# Patient Record
Sex: FEMALE | Race: OTHER | HISPANIC OR LATINO
[De-identification: names, ages, dates, MRNs, and addresses within clinical notes are randomized per-mention and may not be internally consistent; named-entity substitution may affect disease eponyms.]

---

## 2019-02-14 ENCOUNTER — RESULT REVIEW (OUTPATIENT)
Age: 54
End: 2019-02-14

## 2019-12-26 ENCOUNTER — RESULT REVIEW (OUTPATIENT)
Age: 54
End: 2019-12-26

## 2020-01-21 ENCOUNTER — TRANSCRIPTION ENCOUNTER (OUTPATIENT)
Age: 55
End: 2020-01-21

## 2020-01-30 ENCOUNTER — TRANSCRIPTION ENCOUNTER (OUTPATIENT)
Age: 55
End: 2020-01-30

## 2020-10-24 ENCOUNTER — TRANSCRIPTION ENCOUNTER (OUTPATIENT)
Age: 55
End: 2020-10-24

## 2020-11-06 ENCOUNTER — TRANSCRIPTION ENCOUNTER (OUTPATIENT)
Age: 55
End: 2020-11-06

## 2020-12-18 ENCOUNTER — TRANSCRIPTION ENCOUNTER (OUTPATIENT)
Age: 55
End: 2020-12-18

## 2021-02-26 PROBLEM — Z00.00 ENCOUNTER FOR PREVENTIVE HEALTH EXAMINATION: Status: ACTIVE | Noted: 2021-02-26

## 2021-02-28 ENCOUNTER — OUTPATIENT (OUTPATIENT)
Dept: OUTPATIENT SERVICES | Facility: HOSPITAL | Age: 56
LOS: 1 days | End: 2021-02-28
Payer: COMMERCIAL

## 2021-02-28 ENCOUNTER — APPOINTMENT (OUTPATIENT)
Dept: CT IMAGING | Facility: IMAGING CENTER | Age: 56
End: 2021-02-28
Payer: COMMERCIAL

## 2021-02-28 DIAGNOSIS — Z00.8 ENCOUNTER FOR OTHER GENERAL EXAMINATION: ICD-10-CM

## 2021-02-28 PROCEDURE — 70486 CT MAXILLOFACIAL W/O DYE: CPT | Mod: 26

## 2021-02-28 PROCEDURE — 70486 CT MAXILLOFACIAL W/O DYE: CPT

## 2021-10-27 ENCOUNTER — NON-APPOINTMENT (OUTPATIENT)
Age: 56
End: 2021-10-27

## 2021-11-02 ENCOUNTER — APPOINTMENT (OUTPATIENT)
Dept: GYNECOLOGIC ONCOLOGY | Facility: CLINIC | Age: 56
End: 2021-11-02
Payer: COMMERCIAL

## 2021-11-02 VITALS
BODY MASS INDEX: 33.13 KG/M2 | HEIGHT: 63 IN | WEIGHT: 187 LBS | SYSTOLIC BLOOD PRESSURE: 163 MMHG | DIASTOLIC BLOOD PRESSURE: 91 MMHG | HEART RATE: 86 BPM

## 2021-11-02 DIAGNOSIS — Z80.42 FAMILY HISTORY OF MALIGNANT NEOPLASM OF PROSTATE: ICD-10-CM

## 2021-11-02 DIAGNOSIS — Z80.6 FAMILY HISTORY OF LEUKEMIA: ICD-10-CM

## 2021-11-02 DIAGNOSIS — Z80.3 FAMILY HISTORY OF MALIGNANT NEOPLASM OF BREAST: ICD-10-CM

## 2021-11-02 DIAGNOSIS — Z76.89 PERSONS ENCOUNTERING HEALTH SERVICES IN OTHER SPECIFIED CIRCUMSTANCES: ICD-10-CM

## 2021-11-02 PROCEDURE — 99204 OFFICE O/P NEW MOD 45 MIN: CPT

## 2021-11-02 RX ORDER — NAPROXEN 500 MG/1
500 TABLET, DELAYED RELEASE ORAL
Refills: 0 | Status: ACTIVE | COMMUNITY

## 2021-11-02 RX ORDER — MONTELUKAST 10 MG/1
10 TABLET, FILM COATED ORAL
Refills: 0 | Status: ACTIVE | COMMUNITY

## 2021-11-10 ENCOUNTER — NON-APPOINTMENT (OUTPATIENT)
Age: 56
End: 2021-11-10

## 2021-11-10 DIAGNOSIS — Z01.812 ENCOUNTER FOR PREPROCEDURAL LABORATORY EXAMINATION: ICD-10-CM

## 2021-11-10 DIAGNOSIS — F41.9 ANXIETY DISORDER, UNSPECIFIED: ICD-10-CM

## 2021-11-10 RX ORDER — ALPRAZOLAM 0.25 MG/1
0.25 TABLET ORAL
Qty: 2 | Refills: 0 | Status: ACTIVE | COMMUNITY
Start: 2021-11-10 | End: 1900-01-01

## 2021-11-14 ENCOUNTER — APPOINTMENT (OUTPATIENT)
Dept: MRI IMAGING | Facility: CLINIC | Age: 56
End: 2021-11-14
Payer: COMMERCIAL

## 2021-11-14 ENCOUNTER — TRANSCRIPTION ENCOUNTER (OUTPATIENT)
Age: 56
End: 2021-11-14

## 2021-11-14 ENCOUNTER — OUTPATIENT (OUTPATIENT)
Dept: OUTPATIENT SERVICES | Facility: HOSPITAL | Age: 56
LOS: 1 days | End: 2021-11-14
Payer: COMMERCIAL

## 2021-11-14 DIAGNOSIS — R19.00 INTRA-ABDOMINAL AND PELVIC SWELLING, MASS AND LUMP, UNSPECIFIED SITE: ICD-10-CM

## 2021-11-14 DIAGNOSIS — Z00.8 ENCOUNTER FOR OTHER GENERAL EXAMINATION: ICD-10-CM

## 2021-11-14 PROCEDURE — 72197 MRI PELVIS W/O & W/DYE: CPT | Mod: 26

## 2021-11-14 PROCEDURE — 72197 MRI PELVIS W/O & W/DYE: CPT

## 2021-11-14 PROCEDURE — A9585: CPT

## 2021-11-21 ENCOUNTER — NON-APPOINTMENT (OUTPATIENT)
Age: 56
End: 2021-11-21

## 2021-11-23 ENCOUNTER — NON-APPOINTMENT (OUTPATIENT)
Age: 56
End: 2021-11-23

## 2021-11-23 ENCOUNTER — APPOINTMENT (OUTPATIENT)
Dept: SURGICAL ONCOLOGY | Facility: CLINIC | Age: 56
End: 2021-11-23
Payer: COMMERCIAL

## 2021-11-23 VITALS
WEIGHT: 185 LBS | DIASTOLIC BLOOD PRESSURE: 97 MMHG | HEART RATE: 88 BPM | TEMPERATURE: 97.7 F | HEIGHT: 63 IN | OXYGEN SATURATION: 95 % | BODY MASS INDEX: 32.78 KG/M2 | SYSTOLIC BLOOD PRESSURE: 175 MMHG | RESPIRATION RATE: 16 BRPM

## 2021-11-23 PROCEDURE — 99204 OFFICE O/P NEW MOD 45 MIN: CPT

## 2021-12-01 NOTE — ASSESSMENT
[FreeTextEntry1] : 55 y/o female with large symptomatic pelvis mass.\par Lesion encases rectosigmoid colon to deep pelvis, but does not appear to invade.\par \par Plan:  Will coordinate with Dr. Barnes for surgical date.  Discussed with patient possible need for rectosigmoid resection during extirpation of pelvic mass.  Possibility of temporary diverting ileostomy discussed.  She will need oral/mechanical bowel preparation prior to surgery.  All questions answered.

## 2021-12-01 NOTE — HISTORY OF PRESENT ILLNESS
[de-identified] : Ms. 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  Mass measures 12.3 x 9.0 x 9.3 cm.\par She was evaluated by Dr. Barnes, and referred to our office for evaluation in the event rectosigmoid resection is indicated at time of resection.

## 2021-12-27 ENCOUNTER — NON-APPOINTMENT (OUTPATIENT)
Age: 56
End: 2021-12-27

## 2022-01-13 ENCOUNTER — OUTPATIENT (OUTPATIENT)
Dept: OUTPATIENT SERVICES | Facility: HOSPITAL | Age: 57
LOS: 1 days | End: 2022-01-13

## 2022-01-13 VITALS
SYSTOLIC BLOOD PRESSURE: 140 MMHG | HEART RATE: 85 BPM | OXYGEN SATURATION: 99 % | HEIGHT: 62.5 IN | RESPIRATION RATE: 16 BRPM | DIASTOLIC BLOOD PRESSURE: 88 MMHG | TEMPERATURE: 98 F | WEIGHT: 182.1 LBS

## 2022-01-13 DIAGNOSIS — Z90.49 ACQUIRED ABSENCE OF OTHER SPECIFIED PARTS OF DIGESTIVE TRACT: Chronic | ICD-10-CM

## 2022-01-13 DIAGNOSIS — Z98.51 TUBAL LIGATION STATUS: Chronic | ICD-10-CM

## 2022-01-13 DIAGNOSIS — R19.09 OTHER INTRA-ABDOMINAL AND PELVIC SWELLING, MASS AND LUMP: ICD-10-CM

## 2022-01-13 DIAGNOSIS — Z98.890 OTHER SPECIFIED POSTPROCEDURAL STATES: Chronic | ICD-10-CM

## 2022-01-13 DIAGNOSIS — R19.00 INTRA-ABDOMINAL AND PELVIC SWELLING, MASS AND LUMP, UNSPECIFIED SITE: ICD-10-CM

## 2022-01-13 LAB
A1C WITH ESTIMATED AVERAGE GLUCOSE RESULT: 5.7 % — HIGH (ref 4–5.6)
ALBUMIN SERPL ELPH-MCNC: 4.3 G/DL — SIGNIFICANT CHANGE UP (ref 3.3–5)
ALP SERPL-CCNC: 124 U/L — HIGH (ref 40–120)
ALT FLD-CCNC: 31 U/L — SIGNIFICANT CHANGE UP (ref 4–33)
ANION GAP SERPL CALC-SCNC: 8 MMOL/L — SIGNIFICANT CHANGE UP (ref 7–14)
AST SERPL-CCNC: 18 U/L — SIGNIFICANT CHANGE UP (ref 4–32)
BILIRUB SERPL-MCNC: 0.5 MG/DL — SIGNIFICANT CHANGE UP (ref 0.2–1.2)
BLD GP AB SCN SERPL QL: NEGATIVE — SIGNIFICANT CHANGE UP
BUN SERPL-MCNC: 15 MG/DL — SIGNIFICANT CHANGE UP (ref 7–23)
CALCIUM SERPL-MCNC: 9.4 MG/DL — SIGNIFICANT CHANGE UP (ref 8.4–10.5)
CHLORIDE SERPL-SCNC: 102 MMOL/L — SIGNIFICANT CHANGE UP (ref 98–107)
CO2 SERPL-SCNC: 28 MMOL/L — SIGNIFICANT CHANGE UP (ref 22–31)
CREAT SERPL-MCNC: 0.71 MG/DL — SIGNIFICANT CHANGE UP (ref 0.5–1.3)
ESTIMATED AVERAGE GLUCOSE: 117 — SIGNIFICANT CHANGE UP
GLUCOSE SERPL-MCNC: 106 MG/DL — HIGH (ref 70–99)
HCT VFR BLD CALC: 42.1 % — SIGNIFICANT CHANGE UP (ref 34.5–45)
HGB BLD-MCNC: 13.8 G/DL — SIGNIFICANT CHANGE UP (ref 11.5–15.5)
MCHC RBC-ENTMCNC: 30 PG — SIGNIFICANT CHANGE UP (ref 27–34)
MCHC RBC-ENTMCNC: 32.8 GM/DL — SIGNIFICANT CHANGE UP (ref 32–36)
MCV RBC AUTO: 91.5 FL — SIGNIFICANT CHANGE UP (ref 80–100)
NRBC # BLD: 0 /100 WBCS — SIGNIFICANT CHANGE UP
NRBC # FLD: 0 K/UL — SIGNIFICANT CHANGE UP
PLATELET # BLD AUTO: 377 K/UL — SIGNIFICANT CHANGE UP (ref 150–400)
POTASSIUM SERPL-MCNC: 3.1 MMOL/L — LOW (ref 3.5–5.3)
POTASSIUM SERPL-SCNC: 3.1 MMOL/L — LOW (ref 3.5–5.3)
PROT SERPL-MCNC: 7.5 G/DL — SIGNIFICANT CHANGE UP (ref 6–8.3)
RBC # BLD: 4.6 M/UL — SIGNIFICANT CHANGE UP (ref 3.8–5.2)
RBC # FLD: 13.2 % — SIGNIFICANT CHANGE UP (ref 10.3–14.5)
RH IG SCN BLD-IMP: POSITIVE — SIGNIFICANT CHANGE UP
SODIUM SERPL-SCNC: 138 MMOL/L — SIGNIFICANT CHANGE UP (ref 135–145)
WBC # BLD: 7.34 K/UL — SIGNIFICANT CHANGE UP (ref 3.8–10.5)
WBC # FLD AUTO: 7.34 K/UL — SIGNIFICANT CHANGE UP (ref 3.8–10.5)

## 2022-01-13 RX ORDER — SODIUM CHLORIDE 9 MG/ML
3 INJECTION INTRAMUSCULAR; INTRAVENOUS; SUBCUTANEOUS EVERY 8 HOURS
Refills: 0 | Status: DISCONTINUED | OUTPATIENT
Start: 2022-01-24 | End: 2022-01-26

## 2022-01-13 RX ORDER — SODIUM CHLORIDE 9 MG/ML
1000 INJECTION, SOLUTION INTRAVENOUS
Refills: 0 | Status: DISCONTINUED | OUTPATIENT
Start: 2022-01-24 | End: 2022-01-24

## 2022-01-13 RX ORDER — MONTELUKAST 4 MG/1
1 TABLET, CHEWABLE ORAL
Qty: 0 | Refills: 0 | DISCHARGE

## 2022-01-13 NOTE — H&P PST ADULT - HISTORY OF PRESENT ILLNESS
56 year old female with c/o pelvic pain since 7/2021, had imaging done which revealed mass. Pt presents today for presurgical evaluation for ... 56 year old female with c/o pelvic pain since 7/2021, had imaging done which revealed mass. Pt presents today for presurgical evaluation for Exploratory Laparotomy, Resection of Pelvic Mass, Possible Total Abdominal Hysterectomy, Bilateral Salpingo-Oophorectomy, Staging.

## 2022-01-13 NOTE — H&P PST ADULT - PROBLEM SELECTOR PLAN 1
Pt scheduled for surgery on 1/24/2022.  Pre-op instructions provided. Pt verbalized understanding.   Pepcid provided for GI prophylaxis.   Pt given detailed verbal and written instructions on chlorhexidine wash. Pt verbalized understanding with teachback.

## 2022-01-13 NOTE — H&P PST ADULT - NSICDXPASTSURGICALHX_GEN_ALL_CORE_FT
PAST SURGICAL HISTORY:  H/O tubal ligation     S/P breast biopsy 2021 - benign    S/P cholecystectomy

## 2022-01-13 NOTE — H&P PST ADULT - NSICDXFAMILYHX_GEN_ALL_CORE_FT
FAMILY HISTORY:  Father  Still living? Unknown  FH: prostate cancer, Age at diagnosis: Age Unknown    Mother  Still living? Unknown  FH: breast cancer, Age at diagnosis: Age Unknown  FH: leukemia, Age at diagnosis: Age Unknown

## 2022-01-14 ENCOUNTER — NON-APPOINTMENT (OUTPATIENT)
Age: 57
End: 2022-01-14

## 2022-01-14 PROBLEM — J45.909 UNSPECIFIED ASTHMA, UNCOMPLICATED: Chronic | Status: ACTIVE | Noted: 2022-01-13

## 2022-01-14 PROBLEM — U07.1 COVID-19: Chronic | Status: ACTIVE | Noted: 2022-01-13

## 2022-01-18 RX ORDER — POTASSIUM CHLORIDE 1500 MG/1
20 TABLET, FILM COATED, EXTENDED RELEASE ORAL
Qty: 4 | Refills: 0 | Status: ACTIVE | COMMUNITY
Start: 2022-01-18 | End: 1900-01-01

## 2022-01-18 RX ORDER — NEOMYCIN SULFATE 500 MG/1
500 TABLET ORAL
Qty: 6 | Refills: 0 | Status: ACTIVE | COMMUNITY
Start: 2022-01-18 | End: 1900-01-01

## 2022-01-18 RX ORDER — METRONIDAZOLE 250 MG/1
250 TABLET ORAL
Qty: 3 | Refills: 0 | Status: ACTIVE | COMMUNITY
Start: 2022-01-18 | End: 1900-01-01

## 2022-01-20 ENCOUNTER — APPOINTMENT (OUTPATIENT)
Dept: GYNECOLOGIC ONCOLOGY | Facility: CLINIC | Age: 57
End: 2022-01-20
Payer: COMMERCIAL

## 2022-01-20 PROCEDURE — 99214 OFFICE O/P EST MOD 30 MIN: CPT | Mod: 95

## 2022-01-21 ENCOUNTER — NON-APPOINTMENT (OUTPATIENT)
Age: 57
End: 2022-01-21

## 2022-01-21 LAB
ANION GAP SERPL CALC-SCNC: 12 MMOL/L
BUN SERPL-MCNC: 12 MG/DL
CALCIUM SERPL-MCNC: 9.7 MG/DL
CHLORIDE SERPL-SCNC: 106 MMOL/L
CO2 SERPL-SCNC: 25 MMOL/L
CREAT SERPL-MCNC: 0.76 MG/DL
GLUCOSE SERPL-MCNC: 106 MG/DL
POTASSIUM SERPL-SCNC: 4.2 MMOL/L
SODIUM SERPL-SCNC: 142 MMOL/L

## 2022-01-21 NOTE — ASU PATIENT PROFILE, ADULT - DATE OF LAST VACCINATION
03-May-2021 Finasteride Pregnancy And Lactation Text: This medication is absolutely contraindicated during pregnancy. It is unknown if it is excreted in breast milk.

## 2022-01-21 NOTE — ASU PATIENT PROFILE, ADULT - FALL HARM RISK - UNIVERSAL INTERVENTIONS
Bed in lowest position, wheels locked, appropriate side rails in place/Call bell, personal items and telephone in reach/Instruct patient to call for assistance before getting out of bed or chair/Non-slip footwear when patient is out of bed/[place] to call system/Physically safe environment - no spills, clutter or unnecessary equipment/Purposeful Proactive Rounding/Room/bathroom lighting operational, light cord in reach

## 2022-01-23 ENCOUNTER — TRANSCRIPTION ENCOUNTER (OUTPATIENT)
Age: 57
End: 2022-01-23

## 2022-01-24 ENCOUNTER — APPOINTMENT (OUTPATIENT)
Dept: GYNECOLOGIC ONCOLOGY | Facility: HOSPITAL | Age: 57
End: 2022-01-24

## 2022-01-24 ENCOUNTER — RESULT REVIEW (OUTPATIENT)
Age: 57
End: 2022-01-24

## 2022-01-24 ENCOUNTER — APPOINTMENT (OUTPATIENT)
Dept: SURGICAL ONCOLOGY | Facility: HOSPITAL | Age: 57
End: 2022-01-24

## 2022-01-24 ENCOUNTER — INPATIENT (INPATIENT)
Facility: HOSPITAL | Age: 57
LOS: 1 days | Discharge: HOME | End: 2022-01-26
Attending: OBSTETRICS & GYNECOLOGY | Admitting: OBSTETRICS & GYNECOLOGY
Payer: COMMERCIAL

## 2022-01-24 VITALS
HEIGHT: 62.5 IN | RESPIRATION RATE: 18 BRPM | WEIGHT: 182.1 LBS | SYSTOLIC BLOOD PRESSURE: 136 MMHG | HEART RATE: 87 BPM | TEMPERATURE: 98 F | DIASTOLIC BLOOD PRESSURE: 88 MMHG | OXYGEN SATURATION: 100 %

## 2022-01-24 DIAGNOSIS — Z90.49 ACQUIRED ABSENCE OF OTHER SPECIFIED PARTS OF DIGESTIVE TRACT: Chronic | ICD-10-CM

## 2022-01-24 DIAGNOSIS — R19.09 OTHER INTRA-ABDOMINAL AND PELVIC SWELLING, MASS AND LUMP: ICD-10-CM

## 2022-01-24 DIAGNOSIS — R19.00 INTRA-ABDOMINAL AND PELVIC SWELLING, MASS AND LUMP, UNSPECIFIED SITE: ICD-10-CM

## 2022-01-24 DIAGNOSIS — J45.909 UNSPECIFIED ASTHMA, UNCOMPLICATED: ICD-10-CM

## 2022-01-24 DIAGNOSIS — Z98.51 TUBAL LIGATION STATUS: Chronic | ICD-10-CM

## 2022-01-24 DIAGNOSIS — Z98.890 OTHER SPECIFIED POSTPROCEDURAL STATES: Chronic | ICD-10-CM

## 2022-01-24 LAB
GAS PNL BLDA: SIGNIFICANT CHANGE UP
GAS PNL BLDA: SIGNIFICANT CHANGE UP
GLUCOSE BLDC GLUCOMTR-MCNC: 101 MG/DL — HIGH (ref 70–99)

## 2022-01-24 PROCEDURE — 88307 TISSUE EXAM BY PATHOLOGIST: CPT | Mod: 26

## 2022-01-24 PROCEDURE — 49215 EXCISE SACRAL SPINE TUMOR: CPT

## 2022-01-24 PROCEDURE — 88302 TISSUE EXAM BY PATHOLOGIST: CPT | Mod: 26

## 2022-01-24 PROCEDURE — 58150 TOTAL HYSTERECTOMY: CPT

## 2022-01-24 PROCEDURE — 88331 PATH CONSLTJ SURG 1 BLK 1SPC: CPT | Mod: 26

## 2022-01-24 PROCEDURE — 88342 IMHCHEM/IMCYTCHM 1ST ANTB: CPT | Mod: 26

## 2022-01-24 PROCEDURE — 88341 IMHCHEM/IMCYTCHM EA ADD ANTB: CPT | Mod: 26

## 2022-01-24 DEVICE — SEPRAFILM 5 X 6": Type: IMPLANTABLE DEVICE | Status: FUNCTIONAL

## 2022-01-24 DEVICE — VISTASEAL FIBRIN HUMAN 10ML: Type: IMPLANTABLE DEVICE | Status: FUNCTIONAL

## 2022-01-24 DEVICE — LIGATING CLIPS WECK HORIZON LARGE (ORANGE) 24: Type: IMPLANTABLE DEVICE | Status: FUNCTIONAL

## 2022-01-24 DEVICE — LIGATING CLIPS WECK HORIZON MEDIUM (BLUE) 24: Type: IMPLANTABLE DEVICE | Status: FUNCTIONAL

## 2022-01-24 RX ORDER — ACETAMINOPHEN 500 MG
1000 TABLET ORAL ONCE
Refills: 0 | Status: DISCONTINUED | OUTPATIENT
Start: 2022-01-24 | End: 2022-01-25

## 2022-01-24 RX ORDER — ALBUTEROL 90 UG/1
2 AEROSOL, METERED ORAL EVERY 6 HOURS
Refills: 0 | Status: DISCONTINUED | OUTPATIENT
Start: 2022-01-24 | End: 2022-01-26

## 2022-01-24 RX ORDER — IBUPROFEN 200 MG
600 TABLET ORAL EVERY 6 HOURS
Refills: 0 | Status: COMPLETED | OUTPATIENT
Start: 2022-01-24 | End: 2022-12-23

## 2022-01-24 RX ORDER — HYDROMORPHONE HYDROCHLORIDE 2 MG/ML
0.5 INJECTION INTRAMUSCULAR; INTRAVENOUS; SUBCUTANEOUS
Refills: 0 | Status: DISCONTINUED | OUTPATIENT
Start: 2022-01-24 | End: 2022-01-24

## 2022-01-24 RX ORDER — CHLORHEXIDINE GLUCONATE 213 G/1000ML
1 SOLUTION TOPICAL ONCE
Refills: 0 | Status: COMPLETED | OUTPATIENT
Start: 2022-01-24 | End: 2022-01-24

## 2022-01-24 RX ORDER — SODIUM CHLORIDE 9 MG/ML
1000 INJECTION, SOLUTION INTRAVENOUS
Refills: 0 | Status: DISCONTINUED | OUTPATIENT
Start: 2022-01-24 | End: 2022-01-25

## 2022-01-24 RX ORDER — ACETAMINOPHEN 500 MG
1000 TABLET ORAL EVERY 6 HOURS
Refills: 0 | Status: DISCONTINUED | OUTPATIENT
Start: 2022-01-24 | End: 2022-01-24

## 2022-01-24 RX ORDER — SIMETHICONE 80 MG/1
80 TABLET, CHEWABLE ORAL EVERY 6 HOURS
Refills: 0 | Status: DISCONTINUED | OUTPATIENT
Start: 2022-01-24 | End: 2022-01-24

## 2022-01-24 RX ORDER — ACETAMINOPHEN 500 MG
1000 TABLET ORAL ONCE
Refills: 0 | Status: COMPLETED | OUTPATIENT
Start: 2022-01-24 | End: 2022-01-24

## 2022-01-24 RX ORDER — SENNA PLUS 8.6 MG/1
1 TABLET ORAL AT BEDTIME
Refills: 0 | Status: DISCONTINUED | OUTPATIENT
Start: 2022-01-24 | End: 2022-01-26

## 2022-01-24 RX ORDER — SIMETHICONE 80 MG/1
80 TABLET, CHEWABLE ORAL EVERY 6 HOURS
Refills: 0 | Status: COMPLETED | OUTPATIENT
Start: 2022-01-24 | End: 2022-01-25

## 2022-01-24 RX ORDER — KETOROLAC TROMETHAMINE 30 MG/ML
30 SYRINGE (ML) INJECTION EVERY 8 HOURS
Refills: 0 | Status: DISCONTINUED | OUTPATIENT
Start: 2022-01-24 | End: 2022-01-25

## 2022-01-24 RX ORDER — HEPARIN SODIUM 5000 [USP'U]/ML
5000 INJECTION INTRAVENOUS; SUBCUTANEOUS EVERY 8 HOURS
Refills: 0 | Status: DISCONTINUED | OUTPATIENT
Start: 2022-01-24 | End: 2022-01-25

## 2022-01-24 RX ORDER — ONDANSETRON 8 MG/1
4 TABLET, FILM COATED ORAL EVERY 6 HOURS
Refills: 0 | Status: DISCONTINUED | OUTPATIENT
Start: 2022-01-24 | End: 2022-01-26

## 2022-01-24 RX ORDER — BUDESONIDE AND FORMOTEROL FUMARATE DIHYDRATE 160; 4.5 UG/1; UG/1
2 AEROSOL RESPIRATORY (INHALATION)
Refills: 0 | Status: DISCONTINUED | OUTPATIENT
Start: 2022-01-24 | End: 2022-01-26

## 2022-01-24 RX ORDER — HYDROMORPHONE HYDROCHLORIDE 2 MG/ML
1 INJECTION INTRAMUSCULAR; INTRAVENOUS; SUBCUTANEOUS
Refills: 0 | Status: DISCONTINUED | OUTPATIENT
Start: 2022-01-24 | End: 2022-01-24

## 2022-01-24 RX ORDER — ONDANSETRON 8 MG/1
8 TABLET, FILM COATED ORAL EVERY 8 HOURS
Refills: 0 | Status: DISCONTINUED | OUTPATIENT
Start: 2022-01-24 | End: 2022-01-24

## 2022-01-24 RX ORDER — OXYCODONE HYDROCHLORIDE 5 MG/1
5 TABLET ORAL
Refills: 0 | Status: DISCONTINUED | OUTPATIENT
Start: 2022-01-24 | End: 2022-01-26

## 2022-01-24 RX ORDER — ONDANSETRON 8 MG/1
4 TABLET, FILM COATED ORAL ONCE
Refills: 0 | Status: DISCONTINUED | OUTPATIENT
Start: 2022-01-24 | End: 2022-01-24

## 2022-01-24 RX ORDER — OXYCODONE HYDROCHLORIDE 5 MG/1
10 TABLET ORAL EVERY 4 HOURS
Refills: 0 | Status: DISCONTINUED | OUTPATIENT
Start: 2022-01-24 | End: 2022-01-26

## 2022-01-24 RX ORDER — MONTELUKAST 4 MG/1
10 TABLET, CHEWABLE ORAL AT BEDTIME
Refills: 0 | Status: DISCONTINUED | OUTPATIENT
Start: 2022-01-24 | End: 2022-01-26

## 2022-01-24 RX ADMIN — SODIUM CHLORIDE 3 MILLILITER(S): 9 INJECTION INTRAMUSCULAR; INTRAVENOUS; SUBCUTANEOUS at 23:56

## 2022-01-24 RX ADMIN — Medication 30 MILLIGRAM(S): at 23:07

## 2022-01-24 RX ADMIN — Medication 1000 MILLIGRAM(S): at 14:36

## 2022-01-24 RX ADMIN — Medication 400 MILLIGRAM(S): at 14:17

## 2022-01-24 RX ADMIN — CHLORHEXIDINE GLUCONATE 1 APPLICATION(S): 213 SOLUTION TOPICAL at 06:46

## 2022-01-24 RX ADMIN — HEPARIN SODIUM 5000 UNIT(S): 5000 INJECTION INTRAVENOUS; SUBCUTANEOUS at 18:42

## 2022-01-24 RX ADMIN — SODIUM CHLORIDE 125 MILLILITER(S): 9 INJECTION, SOLUTION INTRAVENOUS at 23:23

## 2022-01-24 RX ADMIN — Medication 1000 MILLIGRAM(S): at 23:37

## 2022-01-24 RX ADMIN — SODIUM CHLORIDE 30 MILLILITER(S): 9 INJECTION, SOLUTION INTRAVENOUS at 06:47

## 2022-01-24 RX ADMIN — MONTELUKAST 10 MILLIGRAM(S): 4 TABLET, CHEWABLE ORAL at 23:08

## 2022-01-24 RX ADMIN — SIMETHICONE 80 MILLIGRAM(S): 80 TABLET, CHEWABLE ORAL at 18:42

## 2022-01-24 RX ADMIN — SODIUM CHLORIDE 3 MILLILITER(S): 9 INJECTION INTRAMUSCULAR; INTRAVENOUS; SUBCUTANEOUS at 06:46

## 2022-01-24 RX ADMIN — Medication 400 MILLIGRAM(S): at 23:07

## 2022-01-24 RX ADMIN — BUDESONIDE AND FORMOTEROL FUMARATE DIHYDRATE 2 PUFF(S): 160; 4.5 AEROSOL RESPIRATORY (INHALATION) at 23:39

## 2022-01-24 NOTE — PROGRESS NOTE ADULT - ASSESSMENT
57yo female s/p Exploratory laparotomy- Total abdominal hysterectomy, bilateral salpingectomy (frozen endometrium=benign)  -LR@125  -HSQ q 8hrs  -Clear liquid diet overnight  -Hilton until am  -IV Tylenol,Toradol, Oxycodone prn   -Zofran prn  -Mylicon, Simethicone  -encourage incentive spirometer use  -am CBC,BMP/Mg/Phos-replete lytes prn  -d/w gyn/onc team  Hubert Fung PA-C  #[id number]

## 2022-01-24 NOTE — PATIENT PROFILE ADULT - FALL HARM RISK - HARM RISK INTERVENTIONS

## 2022-01-24 NOTE — BRIEF OPERATIVE NOTE - NSICDXBRIEFPROCEDURE_GEN_ALL_CORE_FT
PROCEDURES:  Exploratory laparotomy 24-Jan-2022 10:18:50  Tenzin Clark   PROCEDURES:  Exploratory laparotomy 24-Jan-2022 10:18:50 pelvic mass excision Tenzin Clark  Flexible sigmoidoscopy 24-Jan-2022 10:21:31  Tenzin Clark

## 2022-01-24 NOTE — BRIEF OPERATIVE NOTE - NSICDXBRIEFPREOP_GEN_ALL_CORE_FT
PRE-OP DIAGNOSIS:  Pelvic mass 24-Jan-2022 10:19:01  Tenzin Clark  Abnormal uterine bleeding 24-Jan-2022 14:06:25  Kori Bender  
PRE-OP DIAGNOSIS:  Pelvic mass 24-Jan-2022 10:19:01  Tenzin Clark

## 2022-01-24 NOTE — PATIENT PROFILE ADULT - FUNCTIONAL ASSESSMENT - BASIC MOBILITY 4.
-- DO NOT REPLY / DO NOT REPLY ALL --  -- Message is from the [facility]--    General Patient Message      Reason for Call: Patient requesting call from doctor to discuss having a consult with Dr. Wong. Please call patient back.     Caller Information       Type Contact Phone    08/22/2021 10:11 AM CDT Phone (Incoming) Guido Tellez (Self) [phone] (M)          Alternative phone number: [phone]          Did the caller agree that this message can wait until the office reopens on Monday (or after the holiday)? YES - The Message Can Wait      Send a message to the provider's clinical support pool.     Turnaround time given to caller:   \"This message will be sent to [state Provider's name]. The clinical team will fulfill your request as soon as they review your message when the office opens on Monday (or after the holiday).\"       4 = No assist / stand by assistance

## 2022-01-24 NOTE — BRIEF OPERATIVE NOTE - NSICDXBRIEFPOSTOP_GEN_ALL_CORE_FT
POST-OP DIAGNOSIS:  Pelvic mass 24-Jan-2022 10:19:12  Tenzin Clark  
POST-OP DIAGNOSIS:  Pelvic mass 24-Jan-2022 10:19:12  Tenzin Clark

## 2022-01-24 NOTE — BRIEF OPERATIVE NOTE - OPERATION/FINDINGS
EUA revealed uterus 10 cm in size palpable fullness in the cul-de-sac. Exploratory Laparotomy revealed small uterus with 2cm posterior subserosal fibroid, fallopian tubes intermittently absent consistent with patient's history of tubal ligation. Pelvic lipomatous mass 15 cm in diameter separate from uterus and adnexa, in the rectouterine space no clear attachment noted. See Dr. Murillo's brief operative note for resection. Decision made to perform Total Abdominal Hysterectomy due to abnormal uterine bleeding. Grossly normal omentum. EUA revealed uterus anterior to palpable fullness in the cul-de-sac. Exploratory Laparotomy revealed small uterus with 2 small subserosal fibroids, fallopian tubes partially absent consistent with patient's history of tubal ligation. Pelvic lipomatous mass 15 cm in diameter separate from uterus and adnexa, in the rectouterine space.See Dr. Murillo's brief operative note for resection. Decision made to perform Total Abdominal Hysterectomy due to unexplained abnormal uterine bleeding. Grossly normal omentum.

## 2022-01-24 NOTE — CHART NOTE - NSCHARTNOTEFT_GEN_A_CORE
Surgery Post op Note    Procedure: TAS-BSO, Exploratory laparotomy with  pelvic mass excision;  Flexible sigmoidoscopy       SUBJECTIVE: Pt seen and examined at bedside several hours after surgery.  Tolerating clear liquid.  SOB:  [ ] YES [* ] NO  Chest Discomfort: [ ] YES [* ] NO    Nausea: [ ] YES [* ] NO           Vomiting: [ ] YES [* ] NO  Flatus: [ ] YES [* ] NO             Bowel Movement: [ ] YES [* ] NO  Void: handley       Pain Control Adequate: [ *] YES [ ] NO      Physical exam  General Appearance: Appears well, NAD  Respiratory: No labored breathing  CV: Pulse regularly present  Abdomen: Soft, nontender, nondistended, incision c/d/i    Vital Signs Last 24 Hrs  T(C): 36.8 (24 Jan 2022 17:13), Max: 37.1 (24 Jan 2022 16:45)  T(F): 98.2 (24 Jan 2022 17:13), Max: 98.7 (24 Jan 2022 16:45)  HR: 87 (24 Jan 2022 17:13) (84 - 96)  BP: 142/73 (24 Jan 2022 17:13) (118/72 - 142/73)  BP(mean): 96 (24 Jan 2022 16:45) (80 - 98)  RR: 18 (24 Jan 2022 17:13) (12 - 20)  SpO2: 100% (24 Jan 2022 17:13) (93% - 100%)  I&O's Summary    24 Jan 2022 07:01  -  24 Jan 2022 19:17  --------------------------------------------------------  IN: 240 mL / OUT: 715 mL / NET: -475 mL      I&O's Detail    24 Jan 2022 07:01  -  24 Jan 2022 19:17  --------------------------------------------------------  IN:    IV PiggyBack: 100 mL    Lactated Ringers: 90 mL    Oral Fluid: 50 mL  Total IN: 240 mL    OUT:    Indwelling Catheter - Urethral (mL): 715 mL  Total OUT: 715 mL    Total NET: -475 mL          MEDICATIONS  (STANDING):  acetaminophen   IVPB .. 1000 milliGRAM(s) IV Intermittent once  budesonide 160 MICROgram(s)/formoterol 4.5 MICROgram(s) Inhaler 2 Puff(s) Inhalation two times a day  heparin   Injectable 5000 Unit(s) SubCutaneous every 8 hours  ketorolac   Injectable 30 milliGRAM(s) IV Push every 8 hours  lactated ringers. 1000 milliLiter(s) (125 mL/Hr) IV Continuous <Continuous>  montelukast 10 milliGRAM(s) Oral at bedtime  simethicone 80 milliGRAM(s) Chew every 6 hours  sodium chloride 0.9% lock flush 3 milliLiter(s) IV Push every 8 hours    MEDICATIONS  (PRN):  ALBUTerol    90 MICROgram(s) HFA Inhaler 2 Puff(s) Inhalation every 6 hours PRN Bronchospasm  ibuprofen  Tablet. 600 milliGRAM(s) Oral every 6 hours PRN Mild Pain (1 - 3)  ondansetron Injectable 4 milliGRAM(s) IV Push every 6 hours PRN Nausea and/or Vomiting  oxyCODONE    IR 5 milliGRAM(s) Oral every 3 hours PRN Moderate Pain (4 - 6)  oxyCODONE    IR 10 milliGRAM(s) Oral every 4 hours PRN Severe Pain (7 - 10)  senna 1 Tablet(s) Oral at bedtime PRN Constipation      LABS:                A: 56 yr F hours s/p YASMINE-BSO, exp lap with pelvic mass resection and intraop flexible sigmoidoscopy   Plan  Pain control prn  ADAT  care per primary team    [id number]  D team

## 2022-01-24 NOTE — BRIEF OPERATIVE NOTE - NSICDXBRIEFPROCEDURE_GEN_ALL_CORE_FT
PROCEDURES:  YASMINE (total abdominal hysterectomy) 24-Jan-2022 13:54:41  Kori Bender  Salpingectomy, bilateral, open 24-Jan-2022 13:55:21  Kori Bender

## 2022-01-24 NOTE — PROGRESS NOTE ADULT - SUBJECTIVE AND OBJECTIVE BOX
GYNECOLOGIC ONCOLOGY PA POST OP NOTE    Pt seen and examined at bedside, resting. Pain well controlled (s/p TAP blocks). Patient denies headache, dizziness, nausea, vomiting, chest pain and sob. Handley in place (adequate UOP: 565cc/3 hrs) Urine in handley bag is light green in color (dye used in OR). Patient tolerating ice chips/sips of water.    OBJECTIVE:     VITALS:  T(F): 98.1 (01-24-22 @ 15:00), Max: 98.3 (01-24-22 @ 06:11)  HR: 90 (01-24-22 @ 15:15) (84 - 93)  BP: 123/73 (01-24-22 @ 15:15) (118/72 - 141/77)  RR: 12 (01-24-22 @ 15:15) (12 - 20)  SpO2: 94% (01-24-22 @ 15:15) (93% - 100%)  Wt(kg): --    I&O's Summary    24 Jan 2022 07:01  -  24 Jan 2022 15:57  --------------------------------------------------------  IN: 210 mL / OUT: 325 mL / NET: -115 mL        MEDICATIONS  (STANDING):  acetaminophen     Tablet .. 1000 milliGRAM(s) Oral every 6 hours  heparin   Injectable 5000 Unit(s) SubCutaneous every 8 hours  ketorolac   Injectable 30 milliGRAM(s) IV Push every 8 hours  lactated ringers. 1000 milliLiter(s) (30 mL/Hr) IV Continuous <Continuous>  sodium chloride 0.9% lock flush 3 milliLiter(s) IV Push every 8 hours    MEDICATIONS  (PRN):  acetaminophen     Tablet .. 1000 milliGRAM(s) Oral every 6 hours PRN Mild Pain (1 - 3)  HYDROmorphone  Injectable 0.5 milliGRAM(s) IV Push every 10 minutes PRN Moderate Pain (4 - 6)  HYDROmorphone  Injectable 1 milliGRAM(s) IV Push every 10 minutes PRN Severe Pain (7 - 10)  ibuprofen  Tablet. 600 milliGRAM(s) Oral every 6 hours PRN Mild Pain (1 - 3)  ondansetron    Tablet 8 milliGRAM(s) Oral every 8 hours PRN Nausea and/or Vomiting  ondansetron Injectable 4 milliGRAM(s) IV Push once PRN Nausea and/or Vomiting  oxyCODONE    IR 5 milliGRAM(s) Oral every 3 hours PRN Moderate Pain (4 - 6)  oxyCODONE    IR 10 milliGRAM(s) Oral every 4 hours PRN Severe Pain (7 - 10)  senna 1 Tablet(s) Oral at bedtime PRN Constipation  simethicone 80 milliGRAM(s) Chew every 6 hours PRN Gas      Physical Exam:  Constitutional: NAD  Pulmonary: clear to auscultation bilaterally   Cardiovascular: Regular rate and rhythm   Abdomen: soft, non-distended, appropriately tender, midline vertical incision w/dermabond prineo dressing C/D/I, abd binder in place  Extremities: no lower extremity edema or calve tenderness, bilat venodynes

## 2022-01-25 ENCOUNTER — TRANSCRIPTION ENCOUNTER (OUTPATIENT)
Age: 57
End: 2022-01-25

## 2022-01-25 LAB
ANION GAP SERPL CALC-SCNC: 9 MMOL/L — SIGNIFICANT CHANGE UP (ref 7–14)
BASOPHILS # BLD AUTO: 0.02 K/UL — SIGNIFICANT CHANGE UP (ref 0–0.2)
BASOPHILS NFR BLD AUTO: 0.1 % — SIGNIFICANT CHANGE UP (ref 0–2)
BUN SERPL-MCNC: 8 MG/DL — SIGNIFICANT CHANGE UP (ref 7–23)
CALCIUM SERPL-MCNC: 8.8 MG/DL — SIGNIFICANT CHANGE UP (ref 8.4–10.5)
CHLORIDE SERPL-SCNC: 105 MMOL/L — SIGNIFICANT CHANGE UP (ref 98–107)
CO2 SERPL-SCNC: 25 MMOL/L — SIGNIFICANT CHANGE UP (ref 22–31)
CREAT SERPL-MCNC: 0.61 MG/DL — SIGNIFICANT CHANGE UP (ref 0.5–1.3)
EOSINOPHIL # BLD AUTO: 0 K/UL — SIGNIFICANT CHANGE UP (ref 0–0.5)
EOSINOPHIL NFR BLD AUTO: 0 % — SIGNIFICANT CHANGE UP (ref 0–6)
GLUCOSE SERPL-MCNC: 105 MG/DL — HIGH (ref 70–99)
HCT VFR BLD CALC: 37.1 % — SIGNIFICANT CHANGE UP (ref 34.5–45)
HGB BLD-MCNC: 12 G/DL — SIGNIFICANT CHANGE UP (ref 11.5–15.5)
IANC: 14.57 K/UL — HIGH (ref 1.5–8.5)
IMM GRANULOCYTES NFR BLD AUTO: 0.5 % — SIGNIFICANT CHANGE UP (ref 0–1.5)
LYMPHOCYTES # BLD AUTO: 1.78 K/UL — SIGNIFICANT CHANGE UP (ref 1–3.3)
LYMPHOCYTES # BLD AUTO: 10.1 % — LOW (ref 13–44)
MAGNESIUM SERPL-MCNC: 2 MG/DL — SIGNIFICANT CHANGE UP (ref 1.6–2.6)
MCHC RBC-ENTMCNC: 29.2 PG — SIGNIFICANT CHANGE UP (ref 27–34)
MCHC RBC-ENTMCNC: 32.3 GM/DL — SIGNIFICANT CHANGE UP (ref 32–36)
MCV RBC AUTO: 90.3 FL — SIGNIFICANT CHANGE UP (ref 80–100)
MONOCYTES # BLD AUTO: 1.16 K/UL — HIGH (ref 0–0.9)
MONOCYTES NFR BLD AUTO: 6.6 % — SIGNIFICANT CHANGE UP (ref 2–14)
NEUTROPHILS # BLD AUTO: 14.57 K/UL — HIGH (ref 1.8–7.4)
NEUTROPHILS NFR BLD AUTO: 82.7 % — HIGH (ref 43–77)
NRBC # BLD: 0 /100 WBCS — SIGNIFICANT CHANGE UP
NRBC # FLD: 0 K/UL — SIGNIFICANT CHANGE UP
PHOSPHATE SERPL-MCNC: 2.9 MG/DL — SIGNIFICANT CHANGE UP (ref 2.5–4.5)
PLATELET # BLD AUTO: 313 K/UL — SIGNIFICANT CHANGE UP (ref 150–400)
POTASSIUM SERPL-MCNC: 3.6 MMOL/L — SIGNIFICANT CHANGE UP (ref 3.5–5.3)
POTASSIUM SERPL-SCNC: 3.6 MMOL/L — SIGNIFICANT CHANGE UP (ref 3.5–5.3)
RBC # BLD: 4.11 M/UL — SIGNIFICANT CHANGE UP (ref 3.8–5.2)
RBC # FLD: 13.4 % — SIGNIFICANT CHANGE UP (ref 10.3–14.5)
SODIUM SERPL-SCNC: 139 MMOL/L — SIGNIFICANT CHANGE UP (ref 135–145)
WBC # BLD: 17.62 K/UL — HIGH (ref 3.8–10.5)
WBC # FLD AUTO: 17.62 K/UL — HIGH (ref 3.8–10.5)

## 2022-01-25 RX ORDER — ENOXAPARIN SODIUM 100 MG/ML
40 INJECTION SUBCUTANEOUS DAILY
Refills: 0 | Status: DISCONTINUED | OUTPATIENT
Start: 2022-01-25 | End: 2022-01-25

## 2022-01-25 RX ORDER — IBUPROFEN 200 MG
600 TABLET ORAL EVERY 6 HOURS
Refills: 0 | Status: DISCONTINUED | OUTPATIENT
Start: 2022-01-25 | End: 2022-01-26

## 2022-01-25 RX ORDER — ACETAMINOPHEN 500 MG
650 TABLET ORAL EVERY 6 HOURS
Refills: 0 | Status: DISCONTINUED | OUTPATIENT
Start: 2022-01-25 | End: 2022-01-26

## 2022-01-25 RX ORDER — ENOXAPARIN SODIUM 100 MG/ML
40 INJECTION SUBCUTANEOUS DAILY
Refills: 0 | Status: DISCONTINUED | OUTPATIENT
Start: 2022-01-25 | End: 2022-01-26

## 2022-01-25 RX ORDER — POTASSIUM CHLORIDE 20 MEQ
20 PACKET (EA) ORAL
Refills: 0 | Status: COMPLETED | OUTPATIENT
Start: 2022-01-25 | End: 2022-01-25

## 2022-01-25 RX ADMIN — Medication 650 MILLIGRAM(S): at 22:55

## 2022-01-25 RX ADMIN — Medication 30 MILLIGRAM(S): at 06:36

## 2022-01-25 RX ADMIN — ENOXAPARIN SODIUM 40 MILLIGRAM(S): 100 INJECTION SUBCUTANEOUS at 17:28

## 2022-01-25 RX ADMIN — HEPARIN SODIUM 5000 UNIT(S): 5000 INJECTION INTRAVENOUS; SUBCUTANEOUS at 02:22

## 2022-01-25 RX ADMIN — Medication 20 MILLIEQUIVALENT(S): at 12:23

## 2022-01-25 RX ADMIN — MONTELUKAST 10 MILLIGRAM(S): 4 TABLET, CHEWABLE ORAL at 22:43

## 2022-01-25 RX ADMIN — Medication 30 MILLIGRAM(S): at 06:46

## 2022-01-25 RX ADMIN — BUDESONIDE AND FORMOTEROL FUMARATE DIHYDRATE 2 PUFF(S): 160; 4.5 AEROSOL RESPIRATORY (INHALATION) at 10:25

## 2022-01-25 RX ADMIN — SIMETHICONE 80 MILLIGRAM(S): 80 TABLET, CHEWABLE ORAL at 02:21

## 2022-01-25 RX ADMIN — Medication 600 MILLIGRAM(S): at 19:12

## 2022-01-25 RX ADMIN — SODIUM CHLORIDE 125 MILLILITER(S): 9 INJECTION, SOLUTION INTRAVENOUS at 07:08

## 2022-01-25 RX ADMIN — HEPARIN SODIUM 5000 UNIT(S): 5000 INJECTION INTRAVENOUS; SUBCUTANEOUS at 10:26

## 2022-01-25 RX ADMIN — Medication 20 MILLIEQUIVALENT(S): at 10:25

## 2022-01-25 RX ADMIN — SODIUM CHLORIDE 3 MILLILITER(S): 9 INJECTION INTRAMUSCULAR; INTRAVENOUS; SUBCUTANEOUS at 23:02

## 2022-01-25 RX ADMIN — Medication 30 MILLIGRAM(S): at 12:23

## 2022-01-25 RX ADMIN — Medication 20 MILLIEQUIVALENT(S): at 13:49

## 2022-01-25 RX ADMIN — SIMETHICONE 80 MILLIGRAM(S): 80 TABLET, CHEWABLE ORAL at 12:23

## 2022-01-25 RX ADMIN — Medication 650 MILLIGRAM(S): at 23:39

## 2022-01-25 RX ADMIN — Medication 650 MILLIGRAM(S): at 17:28

## 2022-01-25 RX ADMIN — SODIUM CHLORIDE 3 MILLILITER(S): 9 INJECTION INTRAMUSCULAR; INTRAVENOUS; SUBCUTANEOUS at 13:35

## 2022-01-25 RX ADMIN — SIMETHICONE 80 MILLIGRAM(S): 80 TABLET, CHEWABLE ORAL at 06:27

## 2022-01-25 RX ADMIN — BUDESONIDE AND FORMOTEROL FUMARATE DIHYDRATE 2 PUFF(S): 160; 4.5 AEROSOL RESPIRATORY (INHALATION) at 22:42

## 2022-01-25 NOTE — PROGRESS NOTE ADULT - SUBJECTIVE AND OBJECTIVE BOX
GYNECOLOGIC ONCOLOGY PROGRESS NOTE    Deann Monreal is a 55 y/o now POD#3 from an exploratory laparotomy, resection of pelvic mass, total abdominal hysterectomy and bilateral salpingectomy. QBL: 771. Uterus was sent to pathology for frozen section and determined to be benign. The pelvic mass was sent to pathology for characterization.    PROBLEMS:  Intra-abdominal and pelvic swelling of mass or lump    Asthma    Pt seen and examined at bedside with GYN Onc Team.    SUBJECTIVE:    Patient has no acute complaints overnight.   Pain well-controlled.  Flatus: passed  Denies Nausea, Vomiting or Diarrhea.  Denies shortness of breath, chest pain or dyspnea on exertion.  Trailed water since post op.    OBJECTIVE:     VITALS:  T(F): 98.5 (01-25-22 @ 01:59), Max: 99.7 (01-24-22 @ 22:49)  HR: 86 (01-25-22 @ 01:59) (84 - 98)  BP: 136/82 (01-25-22 @ 01:59) (118/72 - 142/73)  RR: 18 (01-25-22 @ 01:59) (12 - 20)  SpO2: 100% (01-25-22 @ 01:59) (93% - 100%)    I&O's Summary    24 Jan 2022 07:01  -  25 Jan 2022 06:26  --------------------------------------------------------  IN: 390 mL / OUT: 2515 mL / NET: -2125 mL        MEDICATIONS  (STANDING):  acetaminophen   IVPB .. 1000 milliGRAM(s) IV Intermittent once  budesonide 160 MICROgram(s)/formoterol 4.5 MICROgram(s) Inhaler 2 Puff(s) Inhalation two times a day  heparin   Injectable 5000 Unit(s) SubCutaneous every 8 hours  ketorolac   Injectable 30 milliGRAM(s) IV Push every 8 hours  lactated ringers. 1000 milliLiter(s) (125 mL/Hr) IV Continuous <Continuous>  montelukast 10 milliGRAM(s) Oral at bedtime  simethicone 80 milliGRAM(s) Chew every 6 hours  sodium chloride 0.9% lock flush 3 milliLiter(s) IV Push every 8 hours    MEDICATIONS  (PRN):  ALBUTerol    90 MICROgram(s) HFA Inhaler 2 Puff(s) Inhalation every 6 hours PRN Bronchospasm  ibuprofen  Tablet. 600 milliGRAM(s) Oral every 6 hours PRN Mild Pain (1 - 3)  ondansetron Injectable 4 milliGRAM(s) IV Push every 6 hours PRN Nausea and/or Vomiting  oxyCODONE    IR 5 milliGRAM(s) Oral every 3 hours PRN Moderate Pain (4 - 6)  oxyCODONE    IR 10 milliGRAM(s) Oral every 4 hours PRN Severe Pain (7 - 10)  senna 1 Tablet(s) Oral at bedtime PRN Constipation      Physical Exam:  Constitutional: NAD  Pulmonary: clear to auscultation bilaterally   Cardiovascular: Regular rate and rhythm   Abdomen: soft, non-tender, non-distended, normal bowel signs in all four quadrants  Extremities: no lower extremity edema or calve tenderness, Manasa's sign negative.  Incision: Clean, dry, intact.  Without signs of infection or hernia.      LABS:    Blood type: O Positive  Rubella IgG: RPR:           RADIOLOGY & ADDITIONAL TESTS:  no radiologic tests post operatively   GYNECOLOGIC ONCOLOGY PROGRESS NOTE POD #1    Deann Monreal is a 55 y/o now POD#1 from an exploratory laparotomy, resection of pelvic mass, total abdominal hysterectomy and bilateral salpingectomy.   Patient has no acute complaints overnight.   Pain well-controlled.  Patient is passing flatus.  Denies Nausea, Vomiting or Diarrhea.  Denies shortness of breath, chest pain or dyspnea on exertion.  Tolerated water since post op.    OBJECTIVE:     VITALS:  T(F): 98.5 (01-25-22 @ 01:59), Max: 99.7 (01-24-22 @ 22:49)  HR: 86 (01-25-22 @ 01:59) (84 - 98)  BP: 136/82 (01-25-22 @ 01:59) (118/72 - 142/73)  RR: 18 (01-25-22 @ 01:59) (12 - 20)  SpO2: 100% (01-25-22 @ 01:59) (93% - 100%)    I&O's Summary    24 Jan 2022 07:01  -  25 Jan 2022 06:26  --------------------------------------------------------  IN: 390 mL / OUT: 2515 mL / NET: -2125 mL        MEDICATIONS  (STANDING):  acetaminophen   IVPB .. 1000 milliGRAM(s) IV Intermittent once  budesonide 160 MICROgram(s)/formoterol 4.5 MICROgram(s) Inhaler 2 Puff(s) Inhalation two times a day  heparin   Injectable 5000 Unit(s) SubCutaneous every 8 hours  ketorolac   Injectable 30 milliGRAM(s) IV Push every 8 hours  lactated ringers. 1000 milliLiter(s) (125 mL/Hr) IV Continuous <Continuous>  montelukast 10 milliGRAM(s) Oral at bedtime  simethicone 80 milliGRAM(s) Chew every 6 hours  sodium chloride 0.9% lock flush 3 milliLiter(s) IV Push every 8 hours    MEDICATIONS  (PRN):  ALBUTerol    90 MICROgram(s) HFA Inhaler 2 Puff(s) Inhalation every 6 hours PRN Bronchospasm  ibuprofen  Tablet. 600 milliGRAM(s) Oral every 6 hours PRN Mild Pain (1 - 3)  ondansetron Injectable 4 milliGRAM(s) IV Push every 6 hours PRN Nausea and/or Vomiting  oxyCODONE    IR 5 milliGRAM(s) Oral every 3 hours PRN Moderate Pain (4 - 6)  oxyCODONE    IR 10 milliGRAM(s) Oral every 4 hours PRN Severe Pain (7 - 10)  senna 1 Tablet(s) Oral at bedtime PRN Constipation      Physical Exam:  Constitutional: NAD  Pulmonary: clear to auscultation bilaterally   Cardiovascular: Regular rate and rhythm   Abdomen: soft, non-tender, non-distended, normal bowel signs in all four quadrants  Extremities: no lower extremity edema or calf tenderness, Manasa sign negative  Incision: Midline Verticle, Dermabond Prineo Clean, dry, intact.  Without signs of infection

## 2022-01-25 NOTE — DISCHARGE NOTE PROVIDER - NSDCCPCAREPLAN_GEN_ALL_CORE_FT
PRINCIPAL DISCHARGE DIAGNOSIS  Diagnosis: S/P exploratory laparotomy  Assessment and Plan of Treatment:       SECONDARY DISCHARGE DIAGNOSES  Diagnosis: S/P total abdominal hysterectomy  Assessment and Plan of Treatment:

## 2022-01-25 NOTE — CHART NOTE - NSCHARTNOTEFT_GEN_A_CORE
R4 Note    Patient seen and evaluated at bedside. Meeting postoperative milestones: voiding, passing flatus, ambulating, tolerating reg diet. VSS as below.     T(C): 37.4 (01-25-22 @ 17:41), Max: 37.6 (01-24-22 @ 22:49)  HR: 89 (01-25-22 @ 17:41) (86 - 98)  BP: 117/72 (01-25-22 @ 17:41) (117/72 - 139/77)  RR: 17 (01-25-22 @ 17:41) (17 - 18)  SpO2: 95% (01-25-22 @ 17:41) (94% - 100%)    Plan  -Continue routine postoperative care  ANGELICA Bender PGY4

## 2022-01-25 NOTE — DISCHARGE NOTE PROVIDER - HOSPITAL COURSE
Patient presented for scheduled procedure. She underwent an uncomplicated exploratory laparotomy, resection of pelvic mass, total abdominal hysterectomy and bilateral salpingectomy. Please see operative note for full details. Patient was transferred to recovery room in stable condition. On POD#1, Hilton catheter was discontinued and patient voided without issues. Patient's diet was advanced and she tolerated regular diet without issues.   Labs drawn post-operatively and on POD#1 were stable compared to pre-op.     POD#2 routine post-operative care was performed.  No notable events.   On POD#3 patient was deemed stable for discharge as she was meeting postoperative milestones - tolerating regular diet, ambulating without difficulty, voiding, and pain was well controlled on oral medications. Throughout admission, patient received prophylactic anticoagulation with subcutaneous heparin.  Patient was instructed to follow up with Dr. Barnes in 2 weeks. Patient presented for scheduled procedure. She underwent an uncomplicated exploratory laparotomy, resection of pelvic mass, total abdominal hysterectomy and bilateral salpingectomy. Please see operative note for full details. Patient was transferred to recovery room in stable condition.   On POD#1, Hilton catheter was discontinued and patient voided without issues. Patient's diet was advanced and she tolerated regular diet without issues.   Labs drawn post-operatively and on POD#1 were stable compared to pre-op.     POD#2 routine post-operative care was performed.  No notable events. On POD#2, patient was deemed stable for discharge as she was meeting postoperative milestones - tolerating regular diet, ambulating without difficulty, voiding, and pain was well controlled on oral medications. Throughout admission, patient received prophylactic anticoagulation with subcutaneous heparin on POD#0 and switched to lovenox on POD#1.  Patient was instructed to follow up with Dr. Barnes in 2 weeks.

## 2022-01-25 NOTE — DISCHARGE NOTE PROVIDER - NSDCFUADDINST_GEN_ALL_CORE_FT
Postoperative Instructions     Pain control     For pain control, take the followin. Motrin 600mg four times a day, take with food  2. Add Tylenol 975 four times a day, alternated with motrin  3. Add oxycodone as needed for severe pain not managed well by motrin and tylenol. A prescription has been sent to your pharmacy on file.     Motrin and Tylenol can be obtained over the counter.    Incision Care  Keep your incision(s) clean and dry. It is ok to shower, but do not scrub the incision sites--just allow the water to gently wash over your skin. Remove the outer dressing(s)--the band-aids--the second day after your surgery. There is a Prineo dermabond dressing over the incision. This will be removed at your postoperative appointment.    Postoperative restrictions Nothing in the vagina (tampons, sexual intercourse), No tub baths, pools or hot tubs for 6 weeks (showers are ok!). No lifting anything heavier than 15 lbs, no strenuous exercise for 6 weeks after surgery. Do not pull or cut any stitches that you see around your incision.    Vaginal bleeding   Spotting and intermittent passage of blood clots per vagina is normal in first few weeks after surgery. If you are soaking 1 pad per hour, that is not normal and you should notify Dr. Barnes' office and seek medical attention right away.      Signs of Infection  Some postoperative pain and discomfort is normal. However, if you experience any of the following, you may be developing an infection and should be seen by your doctor: pain that does not get better with the oral medications listed above, fever greater than 100.4F, foul smelling discharge coming from the surgical site, nausea and vomiting that does not stop (especially if you are unable to tolerate oral intake), or inability to urinate. If you experience any of these symptoms, call your doctor's office to be seen right away.    Follow Up  Call Dr. Barnes' office to schedule a postoperative appointment in 2 weeks. The results from the procedure will be discussed with you at that time.

## 2022-01-25 NOTE — DISCHARGE NOTE PROVIDER - NSDCMRMEDTOKEN_GEN_ALL_CORE_FT
albuterol 90 mcg/inh inhalation aerosol: 2 puff(s) inhaled every 6 hours, As Needed  Breo Ellipta 200 mcg-25 mcg/inh inhalation powder: 1 puff(s) inhaled once a day  montelukast 10 mg oral tablet: 1 tab(s) orally once a day (at bedtime)  naproxen 500 mg oral tablet: 1 tab(s) orally once a day, As Needed LD 1/17/2022   acetaminophen 325 mg oral tablet: 2 tab(s) orally every 6 hours  Breo Ellipta 200 mcg-25 mcg/inh inhalation powder: 1 puff(s) inhaled once a day  ibuprofen 600 mg oral tablet: 1 tab(s) orally every 6 hours, As needed, Mild Pain (1 - 3)  montelukast 10 mg oral tablet: 1 tab(s) orally once a day (at bedtime)  oxyCODONE 5 mg oral tablet: 1 tab(s) orally every 6 hours -for severe pain MDD:4    acetaminophen 325 mg oral tablet: 2 tab(s) orally every 6 hours  albuterol 90 mcg/inh inhalation aerosol: 2 puff(s) inhaled every 6 hours, As needed, Bronchospasm  Breo Ellipta 200 mcg-25 mcg/inh inhalation powder: 1 puff(s) inhaled once a day  ibuprofen 600 mg oral tablet: 1 tab(s) orally every 6 hours, As needed, Mild Pain (1 - 3)  montelukast 10 mg oral tablet: 1 tab(s) orally once a day  oxyCODONE 5 mg oral tablet: 1 tab(s) orally every 6 hours -for severe pain MDD:4

## 2022-01-25 NOTE — DISCHARGE NOTE PROVIDER - CARE PROVIDER_API CALL
Danelle Barnes)  Gynecologic Oncology; Obstetrics and Gynecology  [address] [place] [address]  Phone: [phone]  Fax: [phone]  Established Patient  Follow Up Time: 2 weeks

## 2022-01-25 NOTE — PROGRESS NOTE ADULT - SUBJECTIVE AND OBJECTIVE BOX
Surgery Progress Note    SUBJECTIVE: Pt seen and examined at bedside. No acute events overnight. Patient comfortable and in no-apparent distress. Complaining of pain, controlled with medication. Tolerating liquids without nausea or vomiting. Passing flatus but has not yet had a bowel movement.    Vital Signs Last 24 Hrs  T(C): 37.1 (25 Jan 2022 06:30), Max: 37.6 (24 Jan 2022 22:49)  T(F): 98.8 (25 Jan 2022 06:30), Max: 99.7 (24 Jan 2022 22:49)  HR: 92 (25 Jan 2022 06:30) (84 - 98)  BP: 133/76 (25 Jan 2022 06:30) (118/72 - 142/73)  BP(mean): 96 (24 Jan 2022 16:45) (80 - 98)  RR: 18 (25 Jan 2022 06:30) (12 - 20)  SpO2: 94% (25 Jan 2022 06:30) (93% - 100%)    Physical Exam:  General Appearance: Appears well, NAD  Respiratory: No labored breathing  CV: Pulse regularly present  Abdomen: Soft, nontender, nondistended; lower midline incision c/d/i    LABS:                        12.0   17.62 )-----------( 313      ( 25 Jan 2022 07:20 )             37.1     01-25    139  |  105  |  8   ----------------------------<  105<H>  3.6   |  25  |  0.61    Ca    8.8      25 Jan 2022 07:20  Phos  2.9     01-25  Mg     2.00     01-25            INs and OUTs:    01-24-22 @ 07:01  -  01-25-22 @ 07:00  --------------------------------------------------------  IN: 390 mL / OUT: 3115 mL / NET: -2725 mL

## 2022-01-25 NOTE — DISCHARGE NOTE PROVIDER - NSDCCPTREATMENT_GEN_ALL_CORE_FT
PRINCIPAL PROCEDURE  Procedure: Exploratory laparotomy  Findings and Treatment:       SECONDARY PROCEDURE  Procedure: Total abdominal hysterectomy with bilateral salpingectomy  Findings and Treatment:

## 2022-01-25 NOTE — PROGRESS NOTE ADULT - ASSESSMENT
56 year old woman s/p YASMINE-BSO, exp lap with pelvic mass resection and intraop flexible sigmoidoscopy 1/24, recovering appropriately.    Recommendations  - advance diet as tolerated  - pain control  - encourage OOB and ambulation    D Team Surgery  [id number]

## 2022-01-25 NOTE — PROGRESS NOTE ADULT - ASSESSMENT
Patient is a 57 y/o w/ PMHx of asthma now POD#3 from an exploratory laparotomy, resection of pelvic mass, total abdominal hysterectomy and bilateral salpingectomy. Patient is recovering well in the post-operative period. Pain is well controlled with Tylenol, Motrin, and oxycodone.       Cardiac:   - No active issues  - BP well controlled     Pulmonary:   - No active disease  - Incentive spirometer use encouraged.    Neuro:   - Pain well controlled with current regimen.     Endo: No active disease      GI:   - Patient has been drinking solely water overnight. Will advance diet as tolerated today.     :   - Hilton in situ this AM, to be removed if AM labs are satisfactory.  - urine output adequate   - Preop creatinine 0.71-> f/u AM CMP    ID: No active disease. Preop WBC 7.34 -> f/u AM CBC    Heme:   - Preop Hgb  13.8-> f/u AM CBC  - SCDs when not ambulating, HSQ for VTE prophylaxis.     Skin: No active disease. Incision c/d/i with Prineo dressing in place  Psych: no active problems     FEN:   - IVF at 100cc/hr, will discontinue once PO intake is adequate.   -F/u AM CMP    Dispo: to be discussed with attending    Gunjan Harris, PGY1    Patient is a 57 y/o w/ PMHx of asthma now POD#1 from an exploratory laparotomy, resection of pelvic mass, total abdominal hysterectomy and bilateral salpingectomy. Frozen of endometrium= benign. Pelvic mass sent for pathology. Patient is recovering well in the post-operative period.     Neuro:   - Pain well controlled with current regimen. s/p intraoperative tap blocks and Toradol, Tylenol and Oxycodone. Transition to PO medications.    Cardiac:   - No active issues  - BP well controlled     Pulmonary:   - Asthma controlled on home medications: Monteleukast, Symbicort Albuterol.     GI:   - Patient has been drinking solely water overnight. Will advance diet as tolerated today.     :   - Hilton in situ this AM, to be removed if AM labs are satisfactory.  - urine output adequate 150 cc/hr    - Preop creatinine 0.71-> f/u AM CMP    ID: No active disease. Preop WBC 7.34 -> f/u AM CBC    Heme:   - Preop Hgb  13.8-> f/u AM CBC  - SCDs when not ambulating, HSQ for VTE prophylaxis.     FEN:   - IVF at 100cc/hr, will discontinue once PO intake is adequate.   -F/u AM CMP    Endo: No active disease      Dispo: Continue inpatient care     Gunjan Harris, PGY1     I have reviewed and edited the above note  ANGELICA Bender PGY4

## 2022-01-25 NOTE — PROGRESS NOTE ADULT - SUBJECTIVE AND OBJECTIVE BOX
ANESTHESIA POSTOP CHECK    56y Female POSTOP DAY 1 S/P ex lap    Vital Signs Last 24 Hrs  T(C): 37.1 (25 Jan 2022 06:30), Max: 37.6 (24 Jan 2022 22:49)  T(F): 98.8 (25 Jan 2022 06:30), Max: 99.7 (24 Jan 2022 22:49)  HR: 90 (25 Jan 2022 10:24) (84 - 98)  BP: 127/76 (25 Jan 2022 10:24) (118/72 - 142/73)  BP(mean): 96 (24 Jan 2022 16:45) (80 - 98)  RR: 18 (25 Jan 2022 10:24) (12 - 20)  SpO2: 97% (25 Jan 2022 10:24) (93% - 100%)  I&O's Summary    24 Jan 2022 07:01  -  25 Jan 2022 07:00  --------------------------------------------------------  IN: 390 mL / OUT: 3115 mL / NET: -2725 mL        [x ] NO APPARENT ANESTHESIA COMPLICATIONS      Comments:

## 2022-01-26 ENCOUNTER — TRANSCRIPTION ENCOUNTER (OUTPATIENT)
Age: 57
End: 2022-01-26

## 2022-01-26 VITALS
RESPIRATION RATE: 18 BRPM | SYSTOLIC BLOOD PRESSURE: 111 MMHG | DIASTOLIC BLOOD PRESSURE: 70 MMHG | HEART RATE: 85 BPM | OXYGEN SATURATION: 98 % | TEMPERATURE: 98 F

## 2022-01-26 LAB
ANION GAP SERPL CALC-SCNC: 10 MMOL/L — SIGNIFICANT CHANGE UP (ref 7–14)
BASOPHILS # BLD AUTO: 0.04 K/UL — SIGNIFICANT CHANGE UP (ref 0–0.2)
BASOPHILS NFR BLD AUTO: 0.4 % — SIGNIFICANT CHANGE UP (ref 0–2)
BUN SERPL-MCNC: 10 MG/DL — SIGNIFICANT CHANGE UP (ref 7–23)
CALCIUM SERPL-MCNC: 8.8 MG/DL — SIGNIFICANT CHANGE UP (ref 8.4–10.5)
CHLORIDE SERPL-SCNC: 106 MMOL/L — SIGNIFICANT CHANGE UP (ref 98–107)
CO2 SERPL-SCNC: 25 MMOL/L — SIGNIFICANT CHANGE UP (ref 22–31)
CREAT SERPL-MCNC: 0.63 MG/DL — SIGNIFICANT CHANGE UP (ref 0.5–1.3)
EOSINOPHIL # BLD AUTO: 0.07 K/UL — SIGNIFICANT CHANGE UP (ref 0–0.5)
EOSINOPHIL NFR BLD AUTO: 0.7 % — SIGNIFICANT CHANGE UP (ref 0–6)
GLUCOSE SERPL-MCNC: 86 MG/DL — SIGNIFICANT CHANGE UP (ref 70–99)
HCT VFR BLD CALC: 37.7 % — SIGNIFICANT CHANGE UP (ref 34.5–45)
HGB BLD-MCNC: 12.6 G/DL — SIGNIFICANT CHANGE UP (ref 11.5–15.5)
IANC: 7.5 K/UL — SIGNIFICANT CHANGE UP (ref 1.5–8.5)
IMM GRANULOCYTES NFR BLD AUTO: 0.4 % — SIGNIFICANT CHANGE UP (ref 0–1.5)
LYMPHOCYTES # BLD AUTO: 2.21 K/UL — SIGNIFICANT CHANGE UP (ref 1–3.3)
LYMPHOCYTES # BLD AUTO: 20.8 % — SIGNIFICANT CHANGE UP (ref 13–44)
MAGNESIUM SERPL-MCNC: 2 MG/DL — SIGNIFICANT CHANGE UP (ref 1.6–2.6)
MCHC RBC-ENTMCNC: 29.9 PG — SIGNIFICANT CHANGE UP (ref 27–34)
MCHC RBC-ENTMCNC: 33.4 GM/DL — SIGNIFICANT CHANGE UP (ref 32–36)
MCV RBC AUTO: 89.5 FL — SIGNIFICANT CHANGE UP (ref 80–100)
MONOCYTES # BLD AUTO: 0.74 K/UL — SIGNIFICANT CHANGE UP (ref 0–0.9)
MONOCYTES NFR BLD AUTO: 7 % — SIGNIFICANT CHANGE UP (ref 2–14)
NEUTROPHILS # BLD AUTO: 7.5 K/UL — HIGH (ref 1.8–7.4)
NEUTROPHILS NFR BLD AUTO: 70.7 % — SIGNIFICANT CHANGE UP (ref 43–77)
NRBC # BLD: 0 /100 WBCS — SIGNIFICANT CHANGE UP
NRBC # FLD: 0 K/UL — SIGNIFICANT CHANGE UP
PHOSPHATE SERPL-MCNC: 2.2 MG/DL — LOW (ref 2.5–4.5)
PLATELET # BLD AUTO: 276 K/UL — SIGNIFICANT CHANGE UP (ref 150–400)
POTASSIUM SERPL-MCNC: 3.7 MMOL/L — SIGNIFICANT CHANGE UP (ref 3.5–5.3)
POTASSIUM SERPL-SCNC: 3.7 MMOL/L — SIGNIFICANT CHANGE UP (ref 3.5–5.3)
RBC # BLD: 4.21 M/UL — SIGNIFICANT CHANGE UP (ref 3.8–5.2)
RBC # FLD: 14.1 % — SIGNIFICANT CHANGE UP (ref 10.3–14.5)
SODIUM SERPL-SCNC: 141 MMOL/L — SIGNIFICANT CHANGE UP (ref 135–145)
WBC # BLD: 10.6 K/UL — HIGH (ref 3.8–10.5)
WBC # FLD AUTO: 10.6 K/UL — HIGH (ref 3.8–10.5)

## 2022-01-26 RX ORDER — FLUTICASONE FUROATE AND VILANTEROL TRIFENATATE 100; 25 UG/1; UG/1
1 POWDER RESPIRATORY (INHALATION)
Qty: 0 | Refills: 0 | DISCHARGE
Start: 2022-01-26

## 2022-01-26 RX ORDER — ALBUTEROL 90 UG/1
2 AEROSOL, METERED ORAL
Qty: 0 | Refills: 0 | DISCHARGE

## 2022-01-26 RX ORDER — OXYCODONE HYDROCHLORIDE 5 MG/1
1 TABLET ORAL
Qty: 0 | Refills: 0 | DISCHARGE
Start: 2022-01-26

## 2022-01-26 RX ORDER — SODIUM,POTASSIUM PHOSPHATES 278-250MG
1 POWDER IN PACKET (EA) ORAL ONCE
Refills: 0 | Status: COMPLETED | OUTPATIENT
Start: 2022-01-26 | End: 2022-01-26

## 2022-01-26 RX ORDER — ACETAMINOPHEN 500 MG
2 TABLET ORAL
Qty: 0 | Refills: 0 | DISCHARGE
Start: 2022-01-26

## 2022-01-26 RX ORDER — FLUTICASONE FUROATE AND VILANTEROL TRIFENATATE 100; 25 UG/1; UG/1
1 POWDER RESPIRATORY (INHALATION)
Qty: 0 | Refills: 0 | DISCHARGE

## 2022-01-26 RX ORDER — ALBUTEROL 90 UG/1
2 AEROSOL, METERED ORAL
Qty: 0 | Refills: 0 | DISCHARGE
Start: 2022-01-26

## 2022-01-26 RX ORDER — MONTELUKAST 4 MG/1
1 TABLET, CHEWABLE ORAL
Qty: 0 | Refills: 0 | DISCHARGE

## 2022-01-26 RX ORDER — OXYCODONE HYDROCHLORIDE 5 MG/1
1 TABLET ORAL
Qty: 15 | Refills: 0
Start: 2022-01-26

## 2022-01-26 RX ORDER — MONTELUKAST 4 MG/1
1 TABLET, CHEWABLE ORAL
Qty: 0 | Refills: 0 | DISCHARGE
Start: 2022-01-26

## 2022-01-26 RX ORDER — IBUPROFEN 200 MG
1 TABLET ORAL
Qty: 0 | Refills: 0 | DISCHARGE
Start: 2022-01-26

## 2022-01-26 RX ADMIN — SODIUM CHLORIDE 3 MILLILITER(S): 9 INJECTION INTRAMUSCULAR; INTRAVENOUS; SUBCUTANEOUS at 16:27

## 2022-01-26 RX ADMIN — Medication 650 MILLIGRAM(S): at 07:36

## 2022-01-26 RX ADMIN — OXYCODONE HYDROCHLORIDE 5 MILLIGRAM(S): 5 TABLET ORAL at 12:10

## 2022-01-26 RX ADMIN — OXYCODONE HYDROCHLORIDE 5 MILLIGRAM(S): 5 TABLET ORAL at 06:15

## 2022-01-26 RX ADMIN — SODIUM CHLORIDE 3 MILLILITER(S): 9 INJECTION INTRAMUSCULAR; INTRAVENOUS; SUBCUTANEOUS at 05:27

## 2022-01-26 RX ADMIN — Medication 650 MILLIGRAM(S): at 13:10

## 2022-01-26 RX ADMIN — ENOXAPARIN SODIUM 40 MILLIGRAM(S): 100 INJECTION SUBCUTANEOUS at 11:41

## 2022-01-26 RX ADMIN — Medication 1 TABLET(S): at 09:50

## 2022-01-26 RX ADMIN — OXYCODONE HYDROCHLORIDE 5 MILLIGRAM(S): 5 TABLET ORAL at 11:41

## 2022-01-26 RX ADMIN — OXYCODONE HYDROCHLORIDE 5 MILLIGRAM(S): 5 TABLET ORAL at 05:23

## 2022-01-26 RX ADMIN — BUDESONIDE AND FORMOTEROL FUMARATE DIHYDRATE 2 PUFF(S): 160; 4.5 AEROSOL RESPIRATORY (INHALATION) at 08:40

## 2022-01-26 NOTE — DISCHARGE NOTE NURSING/CASE MANAGEMENT/SOCIAL WORK - NSDCPECAREGIVERED_GEN_ALL_CORE
Medline and carenotes for surgical procedure YASMINE, Salpingectomy, EXP Lap, Oxycodone, as well as DC Medications and side effects literature for patient reference.

## 2022-01-26 NOTE — DISCHARGE NOTE NURSING/CASE MANAGEMENT/SOCIAL WORK - NSDPDISTO_GEN_ALL_CORE
Pt  with incision CDI , VS stable Afebrile. pt with positive bowel sounds tony po diet. Voiding/Skilled Nursing Facility

## 2022-01-26 NOTE — PROGRESS NOTE ADULT - ATTENDING COMMENTS
Patient seen and examined, agree with gyn housestaff  POD#1  Doing well  Routine postop care  Labs stable  Exam benign
Patient seen and examined at bedside, agree with above note, including assessment and plan. Abdomen benign. Discussed today's plan of care with patient, all questions answered. Continue routine postop care, consider d/c today or tomorrow.

## 2022-01-26 NOTE — PROGRESS NOTE ADULT - ASSESSMENT
A/P: 57 y/o w/ PMHx of asthma now POD#2 from an exploratory laparotomy, resection of pelvic mass, total abdominal hysterectomy and bilateral salpingectomy. Frozen of endometrium= benign. Pelvic mass sent for pathology. Patient is recovering well in the post-operative period.     Gen: VSS  Neuro: Pain well controlled on current regimen of Tylenol, Ibuprofen and Oxycodone as needed.   CV: hemodynamically stable, H/H  Pulm: CTAB, satting well on RA, incentive spirometer use encouraged, Asthma controlled on home medications: Monteleukast, Symbicort Albuterol.   FEN/GI: SLIV. Replete electrolytes as needed. Bowel sounds/function normal, tolerating PO diet, f/u AM BMP, Mg, Phos  : Hilton removed on POD#1, patient is voiding spontaneously without dysuria, urine output adequate 140 cc/hr  Heme: Hemodynamically stable, f/u AM CBC w/ diff  ID: afebrile, f/u AM CBC w/ diff  DVT ppx: ambulation encouraged and OOB to chair, SCDs when in bed, lovenox  Dispo: Continue inpatient postoperative management    Plan of care d/w Dr. Bender, PGY4  Gunjan Harris, PGY1    A/P: 55 y/o w/ PMHx of asthma now POD#2 from an exploratory laparotomy, resection of pelvic mass, total abdominal hysterectomy and bilateral salpingectomy. Frozen of endometrium= benign. Pelvic mass sent for pathology. Patient is recovering well in the post-operative period.     Gen: VSS  Neuro: Pain well controlled on current regimen of Tylenol, Ibuprofen and Oxycodone as needed.   CV: hemodynamically stable, f/u AM CBC w/ diff  Pulm: CTAB, satting well on RA, incentive spirometer use encouraged, Asthma controlled on home medications: Monteleukast, Symbicort Albuterol.   FEN/GI: SLIV. Replete electrolytes as needed. Bowel sounds/function normal, tolerating PO diet, f/u AM BMP, Mg, Phos  : Hilton removed on POD#1, patient is voiding spontaneously without dysuria, urine output adequate 140 cc/hr  Heme: Hemodynamically stable, f/u AM CBC w/ diff  ID: afebrile, f/u AM CBC w/ diff  DVT ppx: ambulation encouraged and OOB to chair, SCDs when in bed, lovenox  Dispo: Plan to discharge later today pending attending approval    Plan of care d/w Dr. Bender, PGY4  Gunjan Harris, PGY1    A/P: 57 y/o w/ PMHx of asthma now POD#2 from an exploratory laparotomy, resection of pelvic mass, total abdominal hysterectomy and bilateral salpingectomy. Frozen of endometrium= benign. Pelvic mass sent for pathology. Patient is recovering well in the post-operative period.     Gen: VSS  Neuro: Pain well controlled on current regimen of Tylenol, Ibuprofen and Oxycodone as needed.   CV: hemodynamically stable, f/u AM CBC w/ diff  Pulm: CTAB, satting well on RA, incentive spirometer use encouraged, Asthma controlled on home medications: Monteleukast, Symbicort Albuterol.   FEN/GI: SLIV. Replete electrolytes as needed. Bowel sounds/function normal, tolerating PO diet, f/u AM BMP, Mg, Phos  : Hilton removed on POD#1, patient is voiding spontaneously without dysuria, urine output adequate 140 cc/hr  Heme: Hemodynamically stable, f/u AM CBC w/ diff  ID: afebrile, f/u AM CBC w/ diff  DVT ppx: ambulation encouraged and OOB to chair, SCDs when in bed, lovenox  Dispo: Discharge planning    Plan of care d/w Dr. Bender, PGY4  Gunjan Harris, PGY1     I have reviewed the above note which I have edited  ANGELICA Bender PGY4

## 2022-01-26 NOTE — PROGRESS NOTE ADULT - ASSESSMENT
56 year old woman s/p YASMINE-BSO, exp lap with pelvic mass resection and intraop flexible sigmoidoscopy 1/24, recovering appropriately.    Recommendations  - continue regular diet  - pain control  - encourage OOB and ambulation  - no objections to discharge home today    D Team Surgery  [id number]

## 2022-01-26 NOTE — DISCHARGE NOTE NURSING/CASE MANAGEMENT/SOCIAL WORK - NSDCPEFALRISK_GEN_ALL_CORE
For information on Fall & Injury Prevention, visit: [email or web address] OR  [email or web address] OR  https://www.cdc.gov/steadi/patient.html

## 2022-01-26 NOTE — PROGRESS NOTE ADULT - SUBJECTIVE AND OBJECTIVE BOX
GYNECOLOGIC ONCOLOGY PROGRESS NOTE POD#2    SUBJECTIVE:  Deann Monreal is a 57 y/o now POD#2 from an exploratory laparotomy, resection of pelvic mass, total abdominal hysterectomy and bilateral salpingectomy. Pt seen, examined at bedside and doing well meeting all post operative milestones. Patient is without complaints vs Pt states mild abdominal pain that is controlled with current pain regimen.  Pt denies fever, chills, chest pain, SOB, nausea, vomiting, lightheadedness, dizziness.  Pt states she is passing flatus and tolerating a regular diet. []      OBJECTIVE:     VITALS:  T(F): 98.4 (01-26-22 @ 01:38), Max: 99.3 (01-25-22 @ 17:41)  HR: 79 (01-26-22 @ 01:38) (79 - 92)  BP: 123/77 (01-26-22 @ 01:38) (114/69 - 133/76)  RR: 18 (01-26-22 @ 01:38) (17 - 18)  SpO2: 96% (01-26-22 @ 01:38) (94% - 97%)      24 Jan 2022 07:01  -  25 Jan 2022 07:00  --------------------------------------------------------  IN:    IV PiggyBack: 100 mL    Lactated Ringers: 90 mL    Oral Fluid: 200 mL  Total IN: 390 mL    OUT:    Indwelling Catheter - Urethral (mL): 3115 mL  Total OUT: 3115 mL    Total NET: -2725 mL      25 Jan 2022 07:01  -  26 Jan 2022 04:36  --------------------------------------------------------  IN:    Oral Fluid: 120 mL  Total IN: 120 mL    OUT:    Indwelling Catheter - Urethral (mL): 1425 mL    Voided (mL): 1700 mL  Total OUT: 3125 mL    Total NET: -3005 mL        MEDICATIONS  (STANDING):  acetaminophen     Tablet .. 650 milliGRAM(s) Oral every 6 hours  budesonide 160 MICROgram(s)/formoterol 4.5 MICROgram(s) Inhaler 2 Puff(s) Inhalation two times a day  enoxaparin Injectable 40 milliGRAM(s) SubCutaneous daily  montelukast 10 milliGRAM(s) Oral at bedtime  sodium chloride 0.9% lock flush 3 milliLiter(s) IV Push every 8 hours    MEDICATIONS  (PRN):  ALBUTerol    90 MICROgram(s) HFA Inhaler 2 Puff(s) Inhalation every 6 hours PRN Bronchospasm  ibuprofen  Tablet. 600 milliGRAM(s) Oral every 6 hours PRN Mild Pain (1 - 3)  ondansetron Injectable 4 milliGRAM(s) IV Push every 6 hours PRN Nausea and/or Vomiting  oxyCODONE    IR 5 milliGRAM(s) Oral every 3 hours PRN Moderate Pain (4 - 6)  oxyCODONE    IR 10 milliGRAM(s) Oral every 4 hours PRN Severe Pain (7 - 10)  senna 1 Tablet(s) Oral at bedtime PRN Constipation      Physical Exam:  Constitutional: NAD  Cardiovascular: Regular rate and rhythm   Pulmonary: clear to auscultation bilaterally   Abdomen: soft, non-tender, non-distended, normal bowel sounds  Incision: Vertical midline incision with Dermabond  Prineo Dressing, Clean, dry, and intact without signs of infection.  Extremities: no lower extremity edema or calve tenderness, Manasa's sign negative. intermittent compression stockings in place       LABS:                        12.0   17.62 )-----------( 313      ( 25 Jan 2022 07:20 )             37.1     01-25    139  |  105  |  8   ----------------------------<  105<H>  3.6   |  25  |  0.61    Ca    8.8      25 Jan 2022 07:20  Phos  2.9     01-25  Mg     2.00     01-25 GYNECOLOGIC ONCOLOGY PROGRESS NOTE POD#2    SUBJECTIVE:  Deann Monreal is a 57 y/o now POD#2 from an exploratory laparotomy, resection of pelvic mass, total abdominal hysterectomy and bilateral salpingectomy. Pt seen, examined at bedside and doing well meeting all post operative milestones. Patient is without complaints. Pt denies fever, chills, chest pain, SOB, nausea, vomiting, lightheadedness, dizziness.  Pt states she is passing flatus and tolerating a regular diet. She has had a bowel movement.      OBJECTIVE:     VITALS:  T(F): 98.4 (01-26-22 @ 01:38), Max: 99.3 (01-25-22 @ 17:41)  HR: 79 (01-26-22 @ 01:38) (79 - 92)  BP: 123/77 (01-26-22 @ 01:38) (114/69 - 133/76)  RR: 18 (01-26-22 @ 01:38) (17 - 18)  SpO2: 96% (01-26-22 @ 01:38) (94% - 97%)      24 Jan 2022 07:01  -  25 Jan 2022 07:00  --------------------------------------------------------  IN:    IV PiggyBack: 100 mL    Lactated Ringers: 90 mL    Oral Fluid: 200 mL  Total IN: 390 mL    OUT:    Indwelling Catheter - Urethral (mL): 3115 mL  Total OUT: 3115 mL    Total NET: -2725 mL      25 Jan 2022 07:01  -  26 Jan 2022 04:36  --------------------------------------------------------  IN:    Oral Fluid: 120 mL  Total IN: 120 mL    OUT:    Indwelling Catheter - Urethral (mL): 1425 mL    Voided (mL): 1700 mL  Total OUT: 3125 mL    Total NET: -3005 mL        MEDICATIONS  (STANDING):  acetaminophen     Tablet .. 650 milliGRAM(s) Oral every 6 hours  budesonide 160 MICROgram(s)/formoterol 4.5 MICROgram(s) Inhaler 2 Puff(s) Inhalation two times a day  enoxaparin Injectable 40 milliGRAM(s) SubCutaneous daily  montelukast 10 milliGRAM(s) Oral at bedtime  sodium chloride 0.9% lock flush 3 milliLiter(s) IV Push every 8 hours    MEDICATIONS  (PRN):  ALBUTerol    90 MICROgram(s) HFA Inhaler 2 Puff(s) Inhalation every 6 hours PRN Bronchospasm  ibuprofen  Tablet. 600 milliGRAM(s) Oral every 6 hours PRN Mild Pain (1 - 3)  ondansetron Injectable 4 milliGRAM(s) IV Push every 6 hours PRN Nausea and/or Vomiting  oxyCODONE    IR 5 milliGRAM(s) Oral every 3 hours PRN Moderate Pain (4 - 6)  oxyCODONE    IR 10 milliGRAM(s) Oral every 4 hours PRN Severe Pain (7 - 10)  senna 1 Tablet(s) Oral at bedtime PRN Constipation      Physical Exam:  Constitutional: NAD  Cardiovascular: Regular rate and rhythm   Pulmonary: clear to auscultation bilaterally   Abdomen: soft, non-tender, non-distended, normal bowel sounds  Incision: Vertical midline incision with Dermabond Prineo Dressing, Clean, dry, and intact without signs of infection.  Extremities: no lower extremity edema or calve tenderness, Manasa's sign negative. Intermittent compression stockings in place.       LABS:                        12.0   17.62 )-----------( 313      ( 25 Jan 2022 07:20 )             37.1     01-25    139  |  105  |  8   ----------------------------<  105<H>  3.6   |  25  |  0.61    Ca    8.8      25 Jan 2022 07:20  Phos  2.9     01-25  Mg     2.00     01-25 GYNECOLOGIC ONCOLOGY PROGRESS NOTE POD#2    SUBJECTIVE:  Deann Monreal is a 55 y/o now POD#2 from an exploratory laparotomy, resection of pelvic mass, total abdominal hysterectomy and bilateral salpingectomy. Pt seen, examined at bedside and doing well meeting all post operative milestones. Patient is without complaints. Pt denies fever, chills, chest pain, SOB, nausea, vomiting, lightheadedness, dizziness.  Pt states she is passing flatus and tolerating a regular diet.     OBJECTIVE:     VITALS:  T(F): 98.4 (01-26-22 @ 01:38), Max: 99.3 (01-25-22 @ 17:41)  HR: 79 (01-26-22 @ 01:38) (79 - 92)  BP: 123/77 (01-26-22 @ 01:38) (114/69 - 133/76)  RR: 18 (01-26-22 @ 01:38) (17 - 18)  SpO2: 96% (01-26-22 @ 01:38) (94% - 97%)      24 Jan 2022 07:01  -  25 Jan 2022 07:00  --------------------------------------------------------  IN:    IV PiggyBack: 100 mL    Lactated Ringers: 90 mL    Oral Fluid: 200 mL  Total IN: 390 mL    OUT:    Indwelling Catheter - Urethral (mL): 3115 mL  Total OUT: 3115 mL    Total NET: -2725 mL      25 Jan 2022 07:01  -  26 Jan 2022 04:36  --------------------------------------------------------  IN:    Oral Fluid: 120 mL  Total IN: 120 mL    OUT:    Indwelling Catheter - Urethral (mL): 1425 mL    Voided (mL): 1700 mL  Total OUT: 3125 mL    Total NET: -3005 mL        MEDICATIONS  (STANDING):  acetaminophen     Tablet .. 650 milliGRAM(s) Oral every 6 hours  budesonide 160 MICROgram(s)/formoterol 4.5 MICROgram(s) Inhaler 2 Puff(s) Inhalation two times a day  enoxaparin Injectable 40 milliGRAM(s) SubCutaneous daily  montelukast 10 milliGRAM(s) Oral at bedtime  sodium chloride 0.9% lock flush 3 milliLiter(s) IV Push every 8 hours    MEDICATIONS  (PRN):  ALBUTerol    90 MICROgram(s) HFA Inhaler 2 Puff(s) Inhalation every 6 hours PRN Bronchospasm  ibuprofen  Tablet. 600 milliGRAM(s) Oral every 6 hours PRN Mild Pain (1 - 3)  ondansetron Injectable 4 milliGRAM(s) IV Push every 6 hours PRN Nausea and/or Vomiting  oxyCODONE    IR 5 milliGRAM(s) Oral every 3 hours PRN Moderate Pain (4 - 6)  oxyCODONE    IR 10 milliGRAM(s) Oral every 4 hours PRN Severe Pain (7 - 10)  senna 1 Tablet(s) Oral at bedtime PRN Constipation      Physical Exam:  Constitutional: NAD  Cardiovascular: Regular rate and rhythm   Pulmonary: clear to auscultation bilaterally   Abdomen: soft, non-tender, non-distended, normal bowel sounds  Incision: Vertical midline incision with Dermabond Prineo Dressing, Clean, dry, and intact without signs of infection.  Extremities: no lower extremity edema or calve tenderness, Manasa's sign negative. Intermittent compression stockings in place.       LABS:                        12.0   17.62 )-----------( 313      ( 25 Jan 2022 07:20 )             37.1     01-25    139  |  105  |  8   ----------------------------<  105<H>  3.6   |  25  |  0.61    Ca    8.8      25 Jan 2022 07:20  Phos  2.9     01-25  Mg     2.00     01-25

## 2022-01-26 NOTE — DISCHARGE NOTE NURSING/CASE MANAGEMENT/SOCIAL WORK - PATIENT PORTAL LINK FT
You can access the FollowMyHealth Patient Portal offered by [facility] by registering at the following website: [email or web address]. By joining [facility]’s FollowMyHealth portal, you will also be able to view your health information using other applications (apps) compatible with our system.

## 2022-01-26 NOTE — PROGRESS NOTE ADULT - PROBLEM SELECTOR PROBLEM 1
Intra-abdominal and pelvic swelling of mass or lump
Intra-abdominal and pelvic swelling of mass or lump

## 2022-01-26 NOTE — PROGRESS NOTE ADULT - SUBJECTIVE AND OBJECTIVE BOX
Surgery Progress Note    SUBJECTIVE: Pt seen and examined at bedside. Patient comfortable and in no-apparent distress. No acute events overnight. Tolerating regular diet. OOB and voiding spontaneously. Passing flatus but has not yet had a bowel movement.    Vital Signs Last 24 Hrs  T(C): 36.7 (26 Jan 2022 05:12), Max: 37.4 (25 Jan 2022 17:41)  T(F): 98.1 (26 Jan 2022 05:12), Max: 99.3 (25 Jan 2022 17:41)  HR: 79 (26 Jan 2022 05:12) (79 - 92)  BP: 126/73 (26 Jan 2022 05:12) (114/69 - 127/76)  BP(mean): --  RR: 18 (26 Jan 2022 05:12) (17 - 18)  SpO2: 97% (26 Jan 2022 05:12) (94% - 97%)    Physical Exam:  General Appearance: Appears well, NAD  Respiratory: No labored breathing  CV: Pulse regularly present  Abdomen: Soft, nontender, nondistended; lower midline incision c/d/i    LABS:                        12.0   17.62 )-----------( 313      ( 25 Jan 2022 07:20 )             37.1     01-25    139  |  105  |  8   ----------------------------<  105<H>  3.6   |  25  |  0.61    Ca    8.8      25 Jan 2022 07:20  Phos  2.9     01-25  Mg     2.00     01-25            INs and OUTs:    01-25-22 @ 07:01  -  01-26-22 @ 07:00  --------------------------------------------------------  IN: 120 mL / OUT: 3125 mL / NET: -3005 mL

## 2022-01-26 NOTE — PROGRESS NOTE ADULT - PROBLEM SELECTOR PLAN 1
GYN ONC Fellow Addendum:    Pt seen and examined at bedside. Agree with above. Pain controlled. Tolerating liquids, -n/v. +flatus. no yet ambulating, handley in situ.    VS reviewed  Labs reviewed    - Transition to po analgesia  - ADAT  - d/c handley  - Encourage ambulation and IS use  - Replete lytes prn  - DVT ppx: lovenox  - OOB  - Dispo: continue inpatient management    ALEIDA Bruce, PGY6
GYN ONC Fellow Addendum:    Pt seen and examined at bedside. Agree with above. Pain controlled. Tolerating reg diet, -n/v. +flatus. ambulating and voiding.    VS reviewed  Labs reviewed    - Continue current pain regimen  - REg diet  - Encourage ambulation and IS use  - Replete lytes prn  - DVT ppx: lovenox  - OOB  - Dispo: eval for d/c home this PM    ALEIDA Bruce, PGY6

## 2022-01-26 NOTE — DISCHARGE NOTE NURSING/CASE MANAGEMENT/SOCIAL WORK - NSDCPNINST_GEN_ALL_CORE
Maintain incision sites clean and dry, call MD with any signs of infection such as fever, redness or drainage from site. Avoid heavy lifting and strenuous activity, as well as constipation which may be a side effect from taking narcotic pain medication. Continue to drink plenty of fluids to hydrate. Follow-up with surgeon in the office as instructed as well as with PMD for continuity of care.

## 2022-01-27 ENCOUNTER — NON-APPOINTMENT (OUTPATIENT)
Age: 57
End: 2022-01-27

## 2022-01-28 LAB — SURGICAL PATHOLOGY STUDY: SIGNIFICANT CHANGE UP

## 2022-02-09 PROBLEM — D25.9 FIBROID UTERUS: Status: ACTIVE | Noted: 2021-11-02

## 2022-02-09 PROBLEM — Z48.89 POSTOPERATIVE VISIT: Status: ACTIVE | Noted: 2022-02-09

## 2022-02-09 PROBLEM — N93.9 ABNORMAL UTERINE BLEEDING (AUB): Status: ACTIVE | Noted: 2021-11-02

## 2022-02-10 ENCOUNTER — APPOINTMENT (OUTPATIENT)
Dept: GYNECOLOGIC ONCOLOGY | Facility: CLINIC | Age: 57
End: 2022-02-10
Payer: COMMERCIAL

## 2022-02-10 VITALS — HEART RATE: 87 BPM | SYSTOLIC BLOOD PRESSURE: 119 MMHG | DIASTOLIC BLOOD PRESSURE: 82 MMHG

## 2022-02-10 DIAGNOSIS — D25.9 LEIOMYOMA OF UTERUS, UNSPECIFIED: ICD-10-CM

## 2022-02-10 DIAGNOSIS — Z48.89 ENCOUNTER FOR OTHER SPECIFIED SURGICAL AFTERCARE: ICD-10-CM

## 2022-02-10 DIAGNOSIS — N93.9 ABNORMAL UTERINE AND VAGINAL BLEEDING, UNSPECIFIED: ICD-10-CM

## 2022-02-10 PROCEDURE — 99024 POSTOP FOLLOW-UP VISIT: CPT

## 2022-02-15 ENCOUNTER — APPOINTMENT (OUTPATIENT)
Dept: SURGICAL ONCOLOGY | Facility: CLINIC | Age: 57
End: 2022-02-15
Payer: COMMERCIAL

## 2022-02-15 VITALS
TEMPERATURE: 98.1 F | BODY MASS INDEX: 32.07 KG/M2 | OXYGEN SATURATION: 98 % | HEIGHT: 63 IN | DIASTOLIC BLOOD PRESSURE: 94 MMHG | RESPIRATION RATE: 17 BRPM | HEART RATE: 88 BPM | SYSTOLIC BLOOD PRESSURE: 145 MMHG | WEIGHT: 181 LBS

## 2022-02-15 DIAGNOSIS — R19.00 INTRA-ABDOMINAL AND PELVIC SWELLING, MASS AND LUMP, UNSPECIFIED SITE: ICD-10-CM

## 2022-02-15 PROCEDURE — 99024 POSTOP FOLLOW-UP VISIT: CPT

## 2022-02-17 PROBLEM — R19.00 PELVIC MASS IN FEMALE: Status: ACTIVE | Noted: 2021-11-02

## 2022-02-17 NOTE — HISTORY OF PRESENT ILLNESS
[de-identified] : Ms. 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  Mass measures 12.3 x 9.0 x 9.3 cm.\par She was evaluated by Dr. Barnes, and referred to our office for evaluation in the event rectosigmoid resection is indicated at time of resection.\par \par 02/15/2022: Patient is s/p open resection of lipomatous pelvic mass/YASMINE/bilateral salpingectomy 01/24/2022.  She is recovering well.\par Pathology: lipomatous neoplasm, awaiting additional IHC for definitive diagnosis.

## 2022-03-07 ENCOUNTER — NON-APPOINTMENT (OUTPATIENT)
Age: 57
End: 2022-03-07

## 2022-10-27 NOTE — ASU PATIENT PROFILE, ADULT - HAVE YOU HAD A FIRST COVID-19 BOOSTER?
- Continue pantoprazole 40 mg once daily 30 minutes before breakfast.   - Continue high fiber diet.   - Follow up with Dr. Gibbs as scheduled.   - Repeat iron levels and blood count per Dr. Gibbs's orders.   - We will monitor your stools for bleeding. If blood count drops, then will consider repeating EGD and colonoscopy.    Yes

## 2024-02-05 NOTE — PATIENT PROFILE ADULT - FLU SEASON?
Back Pain (Pt arrived via EMS with complaint of back pain that started 4 weeks ago claims its from a blood infection, back is hot to the touch per ems and pt is having spasms, pt received 30mg IV toroaol)      Yes...

## 2024-12-13 ENCOUNTER — APPOINTMENT (OUTPATIENT)
Dept: RADIOLOGY | Facility: CLINIC | Age: 59
End: 2024-12-13
Payer: COMMERCIAL

## 2024-12-13 ENCOUNTER — OUTPATIENT (OUTPATIENT)
Dept: OUTPATIENT SERVICES | Facility: HOSPITAL | Age: 59
LOS: 1 days | End: 2024-12-13
Payer: COMMERCIAL

## 2024-12-13 DIAGNOSIS — Z00.8 ENCOUNTER FOR OTHER GENERAL EXAMINATION: ICD-10-CM

## 2024-12-13 DIAGNOSIS — Z98.51 TUBAL LIGATION STATUS: Chronic | ICD-10-CM

## 2024-12-13 DIAGNOSIS — Z98.890 OTHER SPECIFIED POSTPROCEDURAL STATES: Chronic | ICD-10-CM

## 2024-12-13 DIAGNOSIS — Z90.49 ACQUIRED ABSENCE OF OTHER SPECIFIED PARTS OF DIGESTIVE TRACT: Chronic | ICD-10-CM

## 2024-12-13 PROCEDURE — 73522 X-RAY EXAM HIPS BI 3-4 VIEWS: CPT | Mod: 26

## 2024-12-13 PROCEDURE — 73522 X-RAY EXAM HIPS BI 3-4 VIEWS: CPT

## 2025-01-15 NOTE — H&P PST ADULT - NEUROLOGICAL DETAILS
min = 2 units; 38-52 min = 3 units; 53-67 min = 4 units; 68-82 min = 5 units   Total     [x]  Patient Education billed concurrently with other procedures   [x] Review HEP    [] Progressed/Changed HEP, detail:    [] Other detail:         Objective Information/Functional Measures/Assessment  Patient with limited walking tolerance due to difficulty with (R) SLS secondary to knee pain/OA. Initiated lupillo taps to encourage SLS with patient req (B) UE assist.        Progress toward goals / Updated goals:  []  See Progress Note/Recertification  1. Patient will ambulate 2 x 40 feet with RW to improve household ambulation during household chores.  Status at last assessment: unable  2. Patient will increase standing tolerance to 5 minutes to improve tolerance to household chores including light meal preparation.  Status at last assessment: 45 seconds  Current: goal progressing; remain 70 seconds (1/15/25 - [facility])  3. Patient will demonstrate 5 repetitions from WC to RW for 30 second STS Test to prepare for squatting and lifting during household tasks.  Status at last assessment: 5 repetitions  4.  Patient will increase LEFS Score to 17/80 to improve tolerance to standing and walking during household tasks.  Status at last assessment: 7/80        Next PN/RC due 2/8/25  Authorization due (visit number/date) 36 visits / 3-28-25    PLAN  - Continue Plan of Care  - Upgrade activities as tolerated  - Other : consider ambulation in parallel bars if able      If an interpreting service was utilized for treatment of this patient, the contents of this document represent the material reviewed with the patient via the .    Carlos Guevara PTA    1/15/2025    10:30 AM    Future Appointments   Date Time Provider Department Center   1/15/2025 11:00 AM Carlos Guevara PTA [facility]   1/20/2025 11:40 [facility]   1/22/2025 12:20 PM [facility]   1/27/2025 12:20 PM [place] 
alert and oriented x 3/sensation intact/normal strength
